# Patient Record
Sex: FEMALE | Race: WHITE | Employment: FULL TIME | URBAN - METROPOLITAN AREA
[De-identification: names, ages, dates, MRNs, and addresses within clinical notes are randomized per-mention and may not be internally consistent; named-entity substitution may affect disease eponyms.]

---

## 2017-07-19 ENCOUNTER — HOSPITAL ENCOUNTER (EMERGENCY)
Facility: HOSPITAL | Age: 42
Discharge: HOME | End: 2017-07-19
Attending: EMERGENCY MEDICINE
Payer: COMMERCIAL

## 2017-07-19 ENCOUNTER — APPOINTMENT (OUTPATIENT)
Dept: MRI IMAGING | Facility: HOSPITAL | Age: 42
End: 2017-07-19
Attending: EMERGENCY MEDICINE
Payer: COMMERCIAL

## 2017-07-19 VITALS
RESPIRATION RATE: 18 BRPM | BODY MASS INDEX: 30.12 KG/M2 | DIASTOLIC BLOOD PRESSURE: 81 MMHG | SYSTOLIC BLOOD PRESSURE: 123 MMHG | OXYGEN SATURATION: 97 % | HEIGHT: 63 IN | HEART RATE: 85 BPM | WEIGHT: 170 LBS | TEMPERATURE: 98 F

## 2017-07-19 DIAGNOSIS — G43.809 OTHER TYPE OF NONINTRACTABLE MIGRAINE: Primary | ICD-10-CM

## 2017-07-19 LAB
ANION GAP SERPL CALC-SCNC: 8 MMOL/L (ref 0–18)
BACTERIA UR QL AUTO: NEGATIVE /HPF
BASOPHILS # BLD: 0.1 K/UL (ref 0–0.2)
BASOPHILS NFR BLD: 1 %
BILIRUB UR QL: NEGATIVE
BUN SERPL-MCNC: 12 MG/DL (ref 8–20)
BUN/CREAT SERPL: 17.6 (ref 10–20)
CALCIUM SERPL-MCNC: 10.1 MG/DL (ref 8.5–10.5)
CHLORIDE SERPL-SCNC: 106 MMOL/L (ref 95–110)
CLARITY UR: CLEAR
CO2 SERPL-SCNC: 22 MMOL/L (ref 22–32)
COLOR UR: YELLOW
CREAT SERPL-MCNC: 0.68 MG/DL (ref 0.5–1.5)
EOSINOPHIL # BLD: 0.2 K/UL (ref 0–0.7)
EOSINOPHIL NFR BLD: 4 %
ERYTHROCYTE [DISTWIDTH] IN BLOOD BY AUTOMATED COUNT: 14.1 % (ref 11–15)
GLUCOSE SERPL-MCNC: 94 MG/DL (ref 70–99)
GLUCOSE UR-MCNC: NEGATIVE MG/DL
HCT VFR BLD AUTO: 39.1 % (ref 35–48)
HGB BLD-MCNC: 13.2 G/DL (ref 12–16)
KETONES UR-MCNC: NEGATIVE MG/DL
LEUKOCYTE ESTERASE UR QL STRIP.AUTO: NEGATIVE
LYMPHOCYTES # BLD: 2.7 K/UL (ref 1–4)
LYMPHOCYTES NFR BLD: 42 %
MCH RBC QN AUTO: 29.5 PG (ref 27–32)
MCHC RBC AUTO-ENTMCNC: 33.8 G/DL (ref 32–37)
MCV RBC AUTO: 87.5 FL (ref 80–100)
MONOCYTES # BLD: 0.5 K/UL (ref 0–1)
MONOCYTES NFR BLD: 8 %
NEUTROPHILS # BLD AUTO: 2.9 K/UL (ref 1.8–7.7)
NEUTROPHILS NFR BLD: 45 %
NITRITE UR QL STRIP.AUTO: NEGATIVE
OSMOLALITY UR CALC.SUM OF ELEC: 282 MOSM/KG (ref 275–295)
PH UR: 5 [PH] (ref 5–8)
PLATELET # BLD AUTO: 278 K/UL (ref 140–400)
PMV BLD AUTO: 8 FL (ref 7.4–10.3)
POTASSIUM SERPL-SCNC: 4.1 MMOL/L (ref 3.3–5.1)
PROT UR-MCNC: NEGATIVE MG/DL
RBC # BLD AUTO: 4.47 M/UL (ref 3.7–5.4)
RBC #/AREA URNS AUTO: 1 /HPF
SODIUM SERPL-SCNC: 136 MMOL/L (ref 136–144)
SP GR UR STRIP: 1.01 (ref 1–1.03)
UROBILINOGEN UR STRIP-ACNC: <2
VIT C UR-MCNC: NEGATIVE MG/DL
WBC # BLD AUTO: 6.4 K/UL (ref 4–11)
WBC #/AREA URNS AUTO: <1 /HPF

## 2017-07-19 PROCEDURE — 96374 THER/PROPH/DIAG INJ IV PUSH: CPT

## 2017-07-19 PROCEDURE — 70551 MRI BRAIN STEM W/O DYE: CPT | Performed by: EMERGENCY MEDICINE

## 2017-07-19 PROCEDURE — 93005 ELECTROCARDIOGRAM TRACING: CPT

## 2017-07-19 PROCEDURE — 96375 TX/PRO/DX INJ NEW DRUG ADDON: CPT

## 2017-07-19 PROCEDURE — 99285 EMERGENCY DEPT VISIT HI MDM: CPT

## 2017-07-19 PROCEDURE — 93010 ELECTROCARDIOGRAM REPORT: CPT | Performed by: EMERGENCY MEDICINE

## 2017-07-19 PROCEDURE — 80048 BASIC METABOLIC PNL TOTAL CA: CPT | Performed by: EMERGENCY MEDICINE

## 2017-07-19 PROCEDURE — 81001 URINALYSIS AUTO W/SCOPE: CPT | Performed by: EMERGENCY MEDICINE

## 2017-07-19 PROCEDURE — 85025 COMPLETE CBC W/AUTO DIFF WBC: CPT | Performed by: EMERGENCY MEDICINE

## 2017-07-19 RX ORDER — METOCLOPRAMIDE HYDROCHLORIDE 5 MG/ML
5 INJECTION INTRAMUSCULAR; INTRAVENOUS ONCE
Status: COMPLETED | OUTPATIENT
Start: 2017-07-19 | End: 2017-07-19

## 2017-07-19 RX ORDER — KETOROLAC TROMETHAMINE 30 MG/ML
30 INJECTION, SOLUTION INTRAMUSCULAR; INTRAVENOUS ONCE
Status: COMPLETED | OUTPATIENT
Start: 2017-07-19 | End: 2017-07-19

## 2017-07-19 NOTE — ED INITIAL ASSESSMENT (HPI)
Pt c/o bilateral eye blurry vision and headaches since Monday. Had similar s/s 2 years ago and was diagnosed with htn. Has been on antihypertensives since then. Pt states had her BP checked pta and was \"158/94\". Pt's /85 in triage.

## 2017-07-19 NOTE — ED PROVIDER NOTES
Patient Seen in: [facility] AND [facility] Emergency Department    History   Patient presents with:  Blurred Vision  Headache    Stated Complaint: Blurred vision/ HTN    HPI    Patient is a 79-year-old female who complains of 2 days of very mild headache and vis well-nourished. HEENT:   Head: Normocephalic and atraumatic.    Right Ear: External ear normal.   Left Ear: External ear normal.   Nose: Nose normal.   Mouth/Throat: Oropharynx is clear and moist.   Eyes: Conjunctivae and EOM are normal. Pupils are equal, improved.  ============================================================  ED Course  ------------------------------------------------------------  MDM             MRI findings were shared with patient.    I Encouraged her to follow-up with neurology as instr

## 2017-07-19 NOTE — ED NOTES
Pt admits to feeling much better at this time, headache is completely gone at this time and there is no tingling to the fingers. Sts her blurred vision is almost gone at this time.

## 2017-07-21 ENCOUNTER — OFFICE VISIT (OUTPATIENT)
Dept: NEUROLOGY | Facility: CLINIC | Age: 42
End: 2017-07-21

## 2017-07-21 VITALS
BODY MASS INDEX: 29.77 KG/M2 | HEART RATE: 72 BPM | HEIGHT: 63 IN | WEIGHT: 168 LBS | SYSTOLIC BLOOD PRESSURE: 138 MMHG | RESPIRATION RATE: 16 BRPM | DIASTOLIC BLOOD PRESSURE: 84 MMHG

## 2017-07-21 DIAGNOSIS — R20.2 NUMBNESS AND TINGLING: ICD-10-CM

## 2017-07-21 DIAGNOSIS — G43.009 MIGRAINE WITHOUT AURA AND WITHOUT STATUS MIGRAINOSUS, NOT INTRACTABLE: Primary | ICD-10-CM

## 2017-07-21 DIAGNOSIS — R20.0 NUMBNESS AND TINGLING: ICD-10-CM

## 2017-07-21 DIAGNOSIS — R90.89 ABNORMAL FINDING ON MRI OF BRAIN: ICD-10-CM

## 2017-07-21 PROBLEM — R51.9 HEADACHE: Status: ACTIVE | Noted: 2017-07-21

## 2017-07-21 PROCEDURE — 99244 OFF/OP CNSLTJ NEW/EST MOD 40: CPT | Performed by: OTHER

## 2017-07-21 RX ORDER — DOXEPIN HYDROCHLORIDE 50 MG/1
1 CAPSULE ORAL DAILY
COMMUNITY

## 2017-07-21 RX ORDER — LOSARTAN POTASSIUM 25 MG/1
25 TABLET ORAL
Refills: 1 | Status: ON HOLD | COMMUNITY
Start: 2017-07-11 | End: 2020-10-08

## 2017-07-21 RX ORDER — METHYLPREDNISOLONE 4 MG/1
TABLET ORAL
Qty: 1 PACKAGE | Refills: 0 | Status: ON HOLD | OUTPATIENT
Start: 2017-07-21 | End: 2020-10-08

## 2017-07-21 NOTE — PATIENT INSTRUCTIONS
Migraine headache  Introduction  Migraines are extremely painful, recurring headaches that are sometimes accompanied by other symptoms, such as visual disturbances, for example, seeing an aura or nausea.  There are 2 types of migraine:  Migraine with aura, vessels narrow or constrict, reducing blood flow and leading to visual disturbances, difficulty speaking, weakness, numbness, or tingling sensation in one area of the body, or other similar symptoms.  Later, the blood vessels dilate or enlarge, leading to i whether you have a migraine or another kind of headache, such as a tension or sinus headache.  Your doctor will ask questions about when your headaches occur, how long they last, how often they come on, the location of the pain, and any symptoms that accomp and time it started. Note what you ate for the preceding 24 hours, how long you slept the night before, what you were doing just before the headache, any unusual stress in your life, how long the headache lasted, and what you did to make it stop.   Other li drugs help prevent migraines, although researchers are not sure why:   Divalproex sodium (Depakote)   Gabapentin (Neurontin)   Topiramate (Topamax)   Botox.  Botox, a medication made from a purified form of botulinum toxin, has been approved to treat migrai Cheese   Monosodium glutamate (MSG), a flavor enhancer found often in food from [employer] containing the amino acid tyramine, found in red wine, aged cheese, smoked fish, chicken livers, figs, and some beans   Nuts   Peanut butter   Heath people with low levels of magnesium. In one study, people who took magnesium reduce the frequency of attacks by 41.6%, compared to 15.8% in those who took placebo.  Some studies also suggest that magnesium may help women whose migraines are triggered by the months. More research is needed to see whether butterbur is effective at preventing migraines. The studies used a standardized extract that lowered the amount of substances in the herb that might potentially harm the liver.  If you want to try butterbur for sinensis). Ask your doctor before taking Diane Elk, as it may interact with some medications or cause problems for people with some cancers. Tania (Zingiber officinale)   Ginkgo biloba   [place] bark(Salix spp.).  People who are sensitive to aspirin shoul are believed to correspond to areas throughout the body. Preliminary studies suggest it may relieve pain and allow people with migraines to take less pain medication. More research is needed.  Practitioners believe reflexology helps you become more aware of the head and may be relieved following urination; this remedy is most appropriate for individuals who feel extremely weak and have difficulty keeping their eyes open. Ignatia.  For pain that may be described as a feeling of something being driven into the pattern (such as every seven days), and are accompanied by nausea and vomiting; pain is aggravated by motion, light or sun exposure, odors, and noise; this remedy is appropriate for children who may have a craving for spicy or acidic foods, despite having over-the-counter or prescription, or any complementary therapy before or during your pregnancy. Some doctors may recommend treating mild-to-moderate attacks during pregnancy with acetaminophen (Tylenol).   Warnings and Precautions  Use medications only as d

## 2017-07-21 NOTE — PROGRESS NOTES
Neurology Outpatient Consult Note    Saurav Glass : 1975   Referring Physician: Dr. Brissa Lee  HPI:     Saurav Glass is a 36year old female who is being seen in neurologic evaluation.     Patient is accompanied by her mother and her si Oral Tab Take 25 mg by mouth once daily. Disp:  Rfl: 1   multivitamin Oral Tab Take 1 tablet by mouth daily.  Disp:  Rfl:    methylPREDNISolone (MEDROL) 4 MG Oral Tablet Therapy Pack Take as directed Disp: 1 Package Rfl: 0      Past Medical History:   Diagn red desaturation, no dysarthria or dysphonia  Motor: 5/5 strength proximally and distally; normal bulk and tone; no drift  Sensory: intact to light touch and pinprick throughout  Reflexes: DTRs 2+ in bilateral biceps, brachioradialis, 3+ in patella, no ank ESR, SSA and SSB, treponemal antibody screen, vitamin D       Follow-up in 8 weeks time    Shavon Mccain MD

## 2017-07-24 ENCOUNTER — TELEPHONE (OUTPATIENT)
Dept: NEUROLOGY | Facility: CLINIC | Age: 42
End: 2017-07-24

## 2017-07-24 NOTE — TELEPHONE ENCOUNTER
[employer] for authorization of approval for MRI brain w.wo. Approval was given with  Authorization # [id number] effective 07/24/17 to 08/22/17. [employer] for authorization of approval for MRI C-spine w/wo.   Approval was given with Authorization # [id number]

## 2017-07-25 ENCOUNTER — HOSPITAL ENCOUNTER (OUTPATIENT)
Dept: MRI IMAGING | Facility: HOSPITAL | Age: 42
Discharge: HOME | End: 2017-07-25
Attending: Other
Payer: COMMERCIAL

## 2017-07-25 ENCOUNTER — TELEPHONE (OUTPATIENT)
Dept: NEUROLOGY | Facility: CLINIC | Age: 42
End: 2017-07-25

## 2017-07-25 DIAGNOSIS — R20.2 NUMBNESS AND TINGLING: ICD-10-CM

## 2017-07-25 DIAGNOSIS — R20.0 NUMBNESS AND TINGLING: ICD-10-CM

## 2017-07-25 DIAGNOSIS — R90.89 ABNORMAL FINDING ON MRI OF BRAIN: ICD-10-CM

## 2017-07-25 DIAGNOSIS — G43.009 MIGRAINE WITHOUT AURA AND WITHOUT STATUS MIGRAINOSUS, NOT INTRACTABLE: ICD-10-CM

## 2017-07-25 PROCEDURE — 70553 MRI BRAIN STEM W/O & W/DYE: CPT | Performed by: OTHER

## 2017-07-25 PROCEDURE — 72156 MRI NECK SPINE W/O & W/DYE: CPT | Performed by: OTHER

## 2017-07-25 PROCEDURE — A9575 INJ GADOTERATE MEGLUMI 0.1ML: HCPCS | Performed by: OTHER

## 2017-07-26 ENCOUNTER — TELEPHONE (OUTPATIENT)
Dept: NEUROLOGY | Facility: CLINIC | Age: 42
End: 2017-07-26

## 2017-07-26 RX ORDER — AMITRIPTYLINE HYDROCHLORIDE 25 MG/1
25 TABLET, FILM COATED ORAL NIGHTLY
Qty: 30 TABLET | Refills: 1 | Status: SHIPPED | OUTPATIENT
Start: 2017-07-26 | End: 2017-09-17

## 2017-07-26 NOTE — TELEPHONE ENCOUNTER
S/w pt, discussed results. Will need repeat MRI brain in 6 mos. Sent Rx for elavil, risks / side effects discussed; pt to call w/ update in 2 wks.

## 2017-09-12 ENCOUNTER — PATIENT MESSAGE (OUTPATIENT)
Dept: NEUROLOGY | Facility: CLINIC | Age: 42
End: 2017-09-12

## 2017-09-12 NOTE — TELEPHONE ENCOUNTER
From: Pradip Washingotn  To: Sridevi Dumas MD  Sent: 9/12/2017 2:04 PM CDT  Subject: Visit Follow-up Question    Good Afternoon Dr. Cassandra Pedraza,  I wanted to give you an update. As you know you prescribed to me Amitriptyline for my migraines.  I have still b

## 2017-09-18 NOTE — TELEPHONE ENCOUNTER
Medication request: Amitriptyline 25 mg take 1 tab nightly, qt:30 1 refill    LOV: 7/21/17  NOV: None  Last refill: 7/26/17 qt:30 1 refill

## 2017-09-19 RX ORDER — AMITRIPTYLINE HYDROCHLORIDE 25 MG/1
25 TABLET, FILM COATED ORAL NIGHTLY
Qty: 30 TABLET | Refills: 1 | Status: SHIPPED | OUTPATIENT
Start: 2017-09-19 | End: 2020-10-13

## 2020-10-06 ENCOUNTER — APPOINTMENT (OUTPATIENT)
Dept: CV DIAGNOSTICS | Facility: HOSPITAL | Age: 45
DRG: 281 | End: 2020-10-06
Attending: INTERNAL MEDICINE
Payer: COMMERCIAL

## 2020-10-06 ENCOUNTER — APPOINTMENT (OUTPATIENT)
Dept: CT IMAGING | Facility: HOSPITAL | Age: 45
DRG: 281 | End: 2020-10-06
Attending: EMERGENCY MEDICINE
Payer: COMMERCIAL

## 2020-10-06 ENCOUNTER — HOSPITAL ENCOUNTER (INPATIENT)
Facility: HOSPITAL | Age: 45
LOS: 2 days | Discharge: HOME | DRG: 281 | End: 2020-10-08
Attending: EMERGENCY MEDICINE | Admitting: HOSPITALIST
Payer: COMMERCIAL

## 2020-10-06 DIAGNOSIS — R51.9 ACUTE NONINTRACTABLE HEADACHE, UNSPECIFIED HEADACHE TYPE: ICD-10-CM

## 2020-10-06 DIAGNOSIS — R42 DIZZINESS: ICD-10-CM

## 2020-10-06 DIAGNOSIS — R77.8 ELEVATED TROPONIN: Primary | ICD-10-CM

## 2020-10-06 DIAGNOSIS — R91.8 PULMONARY NODULES: ICD-10-CM

## 2020-10-06 PROBLEM — R79.89 ELEVATED TROPONIN: Status: ACTIVE | Noted: 2020-10-06

## 2020-10-06 PROCEDURE — 93306 TTE W/DOPPLER COMPLETE: CPT | Performed by: INTERNAL MEDICINE

## 2020-10-06 PROCEDURE — 3008F BODY MASS INDEX DOCD: CPT | Performed by: HOSPITALIST

## 2020-10-06 PROCEDURE — 99222 1ST HOSP IP/OBS MODERATE 55: CPT | Performed by: HOSPITALIST

## 2020-10-06 PROCEDURE — 3079F DIAST BP 80-89 MM HG: CPT | Performed by: HOSPITALIST

## 2020-10-06 PROCEDURE — 71260 CT THORAX DX C+: CPT | Performed by: EMERGENCY MEDICINE

## 2020-10-06 PROCEDURE — 3074F SYST BP LT 130 MM HG: CPT | Performed by: HOSPITALIST

## 2020-10-06 RX ORDER — LOSARTAN POTASSIUM 25 MG/1
25 TABLET ORAL NIGHTLY
Status: DISCONTINUED | OUTPATIENT
Start: 2020-10-06 | End: 2020-10-07

## 2020-10-06 RX ORDER — ZOLPIDEM TARTRATE 5 MG/1
5 TABLET ORAL NIGHTLY PRN
COMMUNITY
End: 2021-04-27

## 2020-10-06 RX ORDER — METOPROLOL TARTRATE 100 MG/1
100 TABLET ORAL ONCE AS NEEDED
Status: ACTIVE | OUTPATIENT
Start: 2020-10-06 | End: 2020-10-06

## 2020-10-06 RX ORDER — ACETAMINOPHEN 325 MG/1
650 TABLET ORAL EVERY 6 HOURS PRN
Status: DISCONTINUED | OUTPATIENT
Start: 2020-10-06 | End: 2020-10-07

## 2020-10-06 RX ORDER — METOCLOPRAMIDE HYDROCHLORIDE 5 MG/ML
5 INJECTION INTRAMUSCULAR; INTRAVENOUS EVERY 8 HOURS PRN
Status: DISCONTINUED | OUTPATIENT
Start: 2020-10-06 | End: 2020-10-08

## 2020-10-06 RX ORDER — ASPIRIN 81 MG/1
324 TABLET, CHEWABLE ORAL ONCE
Status: COMPLETED | OUTPATIENT
Start: 2020-10-06 | End: 2020-10-06

## 2020-10-06 RX ORDER — METOPROLOL TARTRATE 50 MG/1
50 TABLET, FILM COATED ORAL ONCE
Status: COMPLETED | OUTPATIENT
Start: 2020-10-07 | End: 2020-10-07

## 2020-10-06 RX ORDER — DOXEPIN HYDROCHLORIDE 50 MG/1
1 CAPSULE ORAL NIGHTLY
Status: DISCONTINUED | OUTPATIENT
Start: 2020-10-06 | End: 2020-10-08

## 2020-10-06 RX ORDER — METOPROLOL TARTRATE 50 MG/1
50 TABLET, FILM COATED ORAL ONCE AS NEEDED
Status: ACTIVE | OUTPATIENT
Start: 2020-10-06 | End: 2020-10-06

## 2020-10-06 RX ORDER — METOCLOPRAMIDE HYDROCHLORIDE 5 MG/ML
10 INJECTION INTRAMUSCULAR; INTRAVENOUS ONCE
Status: COMPLETED | OUTPATIENT
Start: 2020-10-06 | End: 2020-10-06

## 2020-10-06 RX ORDER — METOPROLOL TARTRATE 50 MG/1
50 TABLET, FILM COATED ORAL ONCE AS NEEDED
Status: COMPLETED | OUTPATIENT
Start: 2020-10-06 | End: 2020-10-07

## 2020-10-06 RX ORDER — METOPROLOL TARTRATE 100 MG/1
100 TABLET ORAL ONCE
Status: COMPLETED | OUTPATIENT
Start: 2020-10-07 | End: 2020-10-07

## 2020-10-06 RX ORDER — TEMAZEPAM 7.5 MG/1
15 CAPSULE ORAL NIGHTLY PRN
Status: DISCONTINUED | OUTPATIENT
Start: 2020-10-06 | End: 2020-10-06

## 2020-10-06 RX ORDER — VENLAFAXINE 25 MG/1
25 TABLET ORAL NIGHTLY
Status: DISCONTINUED | OUTPATIENT
Start: 2020-10-06 | End: 2020-10-08

## 2020-10-06 RX ORDER — NITROGLYCERIN 0.4 MG/1
0.4 TABLET SUBLINGUAL ONCE
Status: COMPLETED | OUTPATIENT
Start: 2020-10-06 | End: 2020-10-07

## 2020-10-06 RX ORDER — NAPROXEN 500 MG/1
500 TABLET ORAL ONCE
Status: COMPLETED | OUTPATIENT
Start: 2020-10-06 | End: 2020-10-06

## 2020-10-06 RX ORDER — METOPROLOL TARTRATE 50 MG/1
50 TABLET, FILM COATED ORAL ONCE
Status: COMPLETED | OUTPATIENT
Start: 2020-10-06 | End: 2020-10-06

## 2020-10-06 RX ORDER — METOPROLOL TARTRATE 100 MG/1
100 TABLET ORAL ONCE
Status: COMPLETED | OUTPATIENT
Start: 2020-10-06 | End: 2020-10-06

## 2020-10-06 RX ORDER — METOPROLOL TARTRATE 100 MG/1
100 TABLET ORAL ONCE AS NEEDED
Status: COMPLETED | OUTPATIENT
Start: 2020-10-06 | End: 2020-10-07

## 2020-10-06 RX ORDER — DIPHENHYDRAMINE HYDROCHLORIDE 50 MG/ML
25 INJECTION INTRAMUSCULAR; INTRAVENOUS ONCE
Status: COMPLETED | OUTPATIENT
Start: 2020-10-06 | End: 2020-10-06

## 2020-10-06 RX ORDER — METOPROLOL TARTRATE 5 MG/5ML
5 INJECTION INTRAVENOUS ONCE
Status: COMPLETED | OUTPATIENT
Start: 2020-10-06 | End: 2020-10-06

## 2020-10-06 RX ORDER — METOPROLOL TARTRATE 100 MG/1
100 TABLET ORAL ONCE AS NEEDED
Status: DISCONTINUED | OUTPATIENT
Start: 2020-10-06 | End: 2020-10-08

## 2020-10-06 RX ORDER — ALPRAZOLAM 0.25 MG/1
0.25 TABLET ORAL
Status: ACTIVE | OUTPATIENT
Start: 2020-10-06 | End: 2020-10-07

## 2020-10-06 RX ORDER — DILTIAZEM HYDROCHLORIDE 5 MG/ML
5 INJECTION INTRAVENOUS SEE ADMIN INSTRUCTIONS
Status: DISCONTINUED | OUTPATIENT
Start: 2020-10-06 | End: 2020-10-07

## 2020-10-06 RX ORDER — VENLAFAXINE 25 MG/1
25 TABLET ORAL NIGHTLY
COMMUNITY
End: 2022-02-07

## 2020-10-06 RX ORDER — KETOROLAC TROMETHAMINE 30 MG/ML
30 INJECTION, SOLUTION INTRAMUSCULAR; INTRAVENOUS ONCE
Status: COMPLETED | OUTPATIENT
Start: 2020-10-06 | End: 2020-10-06

## 2020-10-06 RX ORDER — METOPROLOL TARTRATE 50 MG/1
50 TABLET, FILM COATED ORAL ONCE AS NEEDED
Status: DISCONTINUED | OUTPATIENT
Start: 2020-10-06 | End: 2020-10-08

## 2020-10-06 RX ORDER — ZOLPIDEM TARTRATE 5 MG/1
5 TABLET ORAL NIGHTLY PRN
Status: DISCONTINUED | OUTPATIENT
Start: 2020-10-06 | End: 2020-10-08

## 2020-10-06 RX ORDER — METOPROLOL TARTRATE 5 MG/5ML
5 INJECTION INTRAVENOUS SEE ADMIN INSTRUCTIONS
Status: DISCONTINUED | OUTPATIENT
Start: 2020-10-06 | End: 2020-10-07

## 2020-10-06 RX ORDER — ONDANSETRON 2 MG/ML
4 INJECTION INTRAMUSCULAR; INTRAVENOUS EVERY 6 HOURS PRN
Status: DISCONTINUED | OUTPATIENT
Start: 2020-10-06 | End: 2020-10-08

## 2020-10-06 RX ORDER — SODIUM CHLORIDE 0.9 % (FLUSH) 0.9 %
3 SYRINGE (ML) INJECTION AS NEEDED
Status: DISCONTINUED | OUTPATIENT
Start: 2020-10-06 | End: 2020-10-08

## 2020-10-06 NOTE — ED NOTES
Orders for admission, patient is aware of plan and ready to go upstairs.  Any questions, please call ED KATHARINE miller  at extension [phone]  Type of COVID test sent:rapid  COVID Suspicion level: Low    Titratable drug(s) infusing:none  Rate:    LOC at time of transp

## 2020-10-06 NOTE — ED PROVIDER NOTES
Patient Seen in: [facility] AND [facility] Emergency Department      History   Patient presents with:  Dizziness    Stated Complaint: dizziness    HPI    58-year-old female with history of hypertension, migraines, here with complaints of dizziness for 1 day.   Sh as noted in HPI. Constitutional and vital signs reviewed. All other systems reviewed and negative except as noted above.     Physical Exam     ED Triage Vitals [10/06/20 1120]   BP (!) 158/100   Pulse 84   Resp 18   Temp 98 °F (36.7 °C)   Temp src oxygenation.     PROCEDURES:  none    DIAGNOSTICS:   Labs:  Recent Results (from the past 24 hour(s))   RAINBOW DRAW LAVENDER    Collection Time: 10/06/20 12:04 PM   Result Value Ref Range    Hold Lavender Auto Resulted    RAINBOW DRAW LIGHT GREEN    Collec %    Immature Granulocyte % 0.3 %   LIPID PANEL    Collection Time: 10/06/20 12:04 PM   Result Value Ref Range    Cholesterol, Total 238 (H) <200 mg/dL    HDL Cholesterol 53 40 - 59 mg/dL    Triglycerides 233 (H) 30 - 149 mg/dL    LDL Cholesterol 138 (H) < does not have any pertinent problems on file. to contribute to the complexity of his ED evaluation.         EMERGENCY DEPARTMENT MEDICAL DECISION MAKING:  After obtaining the patient's history, performing the physical exam and reviewing the diagnostics, mul

## 2020-10-06 NOTE — H&P
[facility]    PATIENT'S NAME: Adrianna Florez   ATTENDING PHYSICIAN: Geoffrey Light MD   PATIENT ACCOUNT#:   [id number]    LOCATION:  [place] [address]  MEDICAL RECORD #:   [id number]       YOB: 1975  ADMISSION DATE:       10 PHYSICAL EXAMINATION:    GENERAL:  Alert, oriented to time, place, and person. No acute distress. VITAL SIGNS:  Temperature 98.0, pulse 69, respiratory rate 21, blood pressure 138/95, pulse ox 97% on room air. HEENT:  Atraumatic.   Oropharynx clear

## 2020-10-06 NOTE — CONSULTS
[facility] Consult Note    Everett Casey Patient Status:  Emergency    1975 MRN [id number]   Location [address] Attending Laura Rosales MD   Hosp Day # 0 PCP HA JOSEPH     54year old female, consulted for never smoked. She has never used smokeless tobacco. She reports current alcohol use. She reports that she does not use drugs.     Objective:   Temp: 98 °F (36.7 °C)  Pulse: 64  Resp: 15  BP: 148/99    Intake/Output:   No intake or output data in the 24 hour

## 2020-10-06 NOTE — ED NOTES
Patient states her head pain is mild, feeling better, aware of blood result, denies chest pain/fernando

## 2020-10-06 NOTE — ED PROVIDER NOTES
Patient signed out by previous shift to follow-up second troponin and disposition patient. Patient second troponin is 0.065 which is relatively unchanged from previous.   Discussed with [facility] who recommended patient stay in the hospital.  They woul

## 2020-10-07 ENCOUNTER — APPOINTMENT (OUTPATIENT)
Dept: CT IMAGING | Facility: HOSPITAL | Age: 45
DRG: 281 | End: 2020-10-07
Attending: INTERNAL MEDICINE
Payer: COMMERCIAL

## 2020-10-07 ENCOUNTER — APPOINTMENT (OUTPATIENT)
Dept: ULTRASOUND IMAGING | Facility: HOSPITAL | Age: 45
DRG: 281 | End: 2020-10-07
Attending: INTERNAL MEDICINE
Payer: COMMERCIAL

## 2020-10-07 PROCEDURE — 93975 VASCULAR STUDY: CPT | Performed by: INTERNAL MEDICINE

## 2020-10-07 PROCEDURE — 3079F DIAST BP 80-89 MM HG: CPT | Performed by: INTERNAL MEDICINE

## 2020-10-07 PROCEDURE — 99233 SBSQ HOSP IP/OBS HIGH 50: CPT | Performed by: INTERNAL MEDICINE

## 2020-10-07 PROCEDURE — 76775 US EXAM ABDO BACK WALL LIM: CPT | Performed by: INTERNAL MEDICINE

## 2020-10-07 PROCEDURE — 3074F SYST BP LT 130 MM HG: CPT | Performed by: INTERNAL MEDICINE

## 2020-10-07 PROCEDURE — 75574 CT ANGIO HRT W/3D IMAGE: CPT | Performed by: INTERNAL MEDICINE

## 2020-10-07 PROCEDURE — 3008F BODY MASS INDEX DOCD: CPT | Performed by: INTERNAL MEDICINE

## 2020-10-07 RX ORDER — POTASSIUM CHLORIDE 20 MEQ/1
40 TABLET, EXTENDED RELEASE ORAL ONCE
Status: COMPLETED | OUTPATIENT
Start: 2020-10-07 | End: 2020-10-07

## 2020-10-07 RX ORDER — ASPIRIN 81 MG/1
81 TABLET, CHEWABLE ORAL DAILY
Status: DISCONTINUED | OUTPATIENT
Start: 2020-10-07 | End: 2020-10-08

## 2020-10-07 RX ORDER — ATORVASTATIN CALCIUM 40 MG/1
80 TABLET, FILM COATED ORAL NIGHTLY
Status: DISCONTINUED | OUTPATIENT
Start: 2020-10-07 | End: 2020-10-08

## 2020-10-07 RX ORDER — TRAMADOL HYDROCHLORIDE 50 MG/1
50 TABLET ORAL EVERY 6 HOURS PRN
Status: DISCONTINUED | OUTPATIENT
Start: 2020-10-07 | End: 2020-10-08

## 2020-10-07 RX ORDER — SUMATRIPTAN 50 MG/1
50 TABLET, FILM COATED ORAL EVERY 2 HOUR PRN
Status: DISCONTINUED | OUTPATIENT
Start: 2020-10-07 | End: 2020-10-08

## 2020-10-07 RX ORDER — ENALAPRIL MALEATE 5 MG/1
5 TABLET ORAL 2 TIMES DAILY
Status: DISCONTINUED | OUTPATIENT
Start: 2020-10-07 | End: 2020-10-08

## 2020-10-07 RX ORDER — CARVEDILOL 6.25 MG/1
6.25 TABLET ORAL 2 TIMES DAILY WITH MEALS
Status: DISCONTINUED | OUTPATIENT
Start: 2020-10-07 | End: 2020-10-08

## 2020-10-07 RX ORDER — ACETAMINOPHEN 325 MG/1
650 TABLET ORAL EVERY 6 HOURS PRN
Status: DISCONTINUED | OUTPATIENT
Start: 2020-10-07 | End: 2020-10-08

## 2020-10-07 RX ORDER — METOPROLOL TARTRATE 5 MG/5ML
INJECTION INTRAVENOUS
Status: COMPLETED
Start: 2020-10-07 | End: 2020-10-07

## 2020-10-07 NOTE — PLAN OF CARE
Pain managed with Naproxen, given ambien, admission and med req complete, pt denies any concern, resting comfortably in hospital bed. Pt ambulatory, independent with care. Bed locked in lowest position. Call light on reach, will continue to monitor.       P if interventions unsuccessful or patient reports new pain  - Anticipate increased pain with activity and pre-medicate as appropriate  Outcome: Progressing

## 2020-10-07 NOTE — PLAN OF CARE
Ambulates independently. Replaced k. US kidney, CTA, and 2d echo done today, results in chart. C/o constant headache. Received tylenol and imitrex which did not provide relief. MD paged for alternative pain medication, gave order for PRN tramadol.  Patient bleeding, hypotension and signs of decreased cardiac output  - Evaluate effectiveness of vasoactive medications to optimize hemodynamic stability  - Monitor arterial and/or venous puncture sites for bleeding and/or hematoma  - Assess quality of pulses, ski

## 2020-10-07 NOTE — PROGRESS NOTES
[facility] AND [facility]  [facility] Cardiology Progress Note    Fay Cabrera Patient Status:  Inpatient    1975 MRN [id number]   Location [facility] 3W/SW Attending Delia Zuluaga MD   Hosp Day # 1 PCP HA JOSEPH     54year old female, cons Exam:    General: NAD  HEENT: Normocephalic, anicteric sclera, neck supple. Neck: No JVD, carotids 2+, no bruits. Cardiac: Regular rate and rhythm. S1, S2 normal. No murmur, pericardial rub, S3.  Lungs: Clear without wheezes, rales, rhonchi or dullness.

## 2020-10-07 NOTE — PROGRESS NOTES
[facility]    Progress Note    Ludin Mancia Patient Status:  Inpatient    1975 MRN [id number]   Location [facility] 3W/SW Attending Casey Coleman MD   [facility] Day # 1 PCP HA JOSEPH       Subjective:   No acute events 25 mg Oral Nightly   • multivitamin  1 tablet Oral Nightly   • Venlafaxine HCl  25 mg Oral Nightly       Current PRN Inpatient Meds:      acetaminophen, metoprolol tartrate **OR** metoprolol tartrate, Normal Saline Flush, ondansetron HCl, Metoclopramide H change Electronically signed on 10/06/2020 at 15:15 by Sondra Glass MD    Ekg 12-lead    Result Date: 10/6/2020  ECG Report  Interpretation  -------------------------- Sinus Rhythm WITHIN NORMAL LIMITS When compared with ECG of 07/19/2017 11:25:31 No sign

## 2020-10-07 NOTE — IMAGING NOTE
HX TAKEN :pt is an inpatient, with complaints of dizziness    PT CONSENTED BY FLOOR RN IN CHART    BASELINE VITAL SIGNS   HR 59 /85    CTA ORDERED BY DR MAYORGA WAS PT GIVEN CTA  PREMEDS IF YES SEE MAR, METOPROLOL 100MG PO THIS AM BY FLOOR RN   18

## 2020-10-08 VITALS
TEMPERATURE: 98 F | HEART RATE: 75 BPM | HEIGHT: 63 IN | DIASTOLIC BLOOD PRESSURE: 88 MMHG | RESPIRATION RATE: 16 BRPM | OXYGEN SATURATION: 97 % | WEIGHT: 170.13 LBS | SYSTOLIC BLOOD PRESSURE: 124 MMHG | BODY MASS INDEX: 30.14 KG/M2

## 2020-10-08 LAB
CHOLEST SERPL-MCNC: 234 MG/DL
CHOLEST/HDLC SERPL: 50 {RATIO}
HCT VFR BLD CALC: 41.1 %
HGB BLD-MCNC: 13.6 G/DL
PLATELET # BLD: 301 K/MCL
RBC # BLD: 4.57 10*6/UL
TRIGL SERPL-MCNC: 270 MG/DL
VLDLC SERPL CALC-MCNC: 54 MG/DL
WBC # BLD: 8 K/MCL

## 2020-10-08 PROCEDURE — 3008F BODY MASS INDEX DOCD: CPT | Performed by: INTERNAL MEDICINE

## 2020-10-08 PROCEDURE — 3074F SYST BP LT 130 MM HG: CPT | Performed by: INTERNAL MEDICINE

## 2020-10-08 PROCEDURE — 99239 HOSP IP/OBS DSCHRG MGMT >30: CPT | Performed by: INTERNAL MEDICINE

## 2020-10-08 PROCEDURE — 3079F DIAST BP 80-89 MM HG: CPT | Performed by: INTERNAL MEDICINE

## 2020-10-08 RX ORDER — ATORVASTATIN CALCIUM 80 MG/1
80 TABLET, FILM COATED ORAL NIGHTLY
Qty: 30 TABLET | Refills: 2 | Status: SHIPPED | OUTPATIENT
Start: 2020-10-08 | End: 2021-01-11

## 2020-10-08 RX ORDER — CARVEDILOL 12.5 MG/1
12.5 TABLET ORAL 2 TIMES DAILY WITH MEALS
Qty: 60 TABLET | Refills: 2 | Status: SHIPPED | OUTPATIENT
Start: 2020-10-08 | End: 2020-12-10

## 2020-10-08 RX ORDER — SPIRONOLACTONE 25 MG/1
25 TABLET ORAL DAILY
Qty: 30 TABLET | Refills: 2 | Status: SHIPPED | OUTPATIENT
Start: 2020-10-09 | End: 2020-10-13

## 2020-10-08 RX ORDER — ENALAPRIL MALEATE 5 MG/1
5 TABLET ORAL 2 TIMES DAILY
Qty: 60 TABLET | Refills: 2 | Status: SHIPPED | OUTPATIENT
Start: 2020-10-08 | End: 2020-10-27 | Stop reason: ALTCHOICE

## 2020-10-08 RX ORDER — CARVEDILOL 12.5 MG/1
12.5 TABLET ORAL 2 TIMES DAILY WITH MEALS
Status: DISCONTINUED | OUTPATIENT
Start: 2020-10-08 | End: 2020-10-08

## 2020-10-08 RX ORDER — ENALAPRIL MALEATE 5 MG/1
5 TABLET ORAL 2 TIMES DAILY
Status: DISCONTINUED | OUTPATIENT
Start: 2020-10-08 | End: 2020-10-08

## 2020-10-08 RX ORDER — ASPIRIN 81 MG/1
81 TABLET, CHEWABLE ORAL DAILY
Qty: 30 TABLET | Refills: 2 | Status: SHIPPED | OUTPATIENT
Start: 2020-10-09

## 2020-10-08 RX ORDER — ACETAMINOPHEN 325 MG/1
650 TABLET ORAL EVERY 6 HOURS PRN
Refills: 0 | Status: SHIPPED | COMMUNITY
Start: 2020-10-08 | End: 2021-09-28 | Stop reason: ALTCHOICE

## 2020-10-08 RX ORDER — SPIRONOLACTONE 25 MG/1
25 TABLET ORAL DAILY
Status: DISCONTINUED | OUTPATIENT
Start: 2020-10-08 | End: 2020-10-08

## 2020-10-08 NOTE — DISCHARGE PLANNING
Patient and her daughter Conrado (with patient's permission) were both provided with discharge instructions, education, and follow up information. Printed prescriptions were faxed to patient's pharmacy.  Patient and daughter both verbalize understanding of fo

## 2020-10-08 NOTE — PLAN OF CARE
Alert. Pain managed with Tramadol. One dose give and no further complaints. Blood pressure improved. Possible discharge tomorrow. Bed locked and in lowest position. Call in light in reach. Will continue to monitor.       Problem: Patient [address] new pain  - Anticipate increased pain with activity and pre-medicate as appropriate  Outcome: Progressing     Problem: CARDIOVASCULAR - ADULT  Goal: Maintains optimal cardiac output and hemodynamic stability  Description: INTERVENTIONS:  - Monitor vital si

## 2020-10-08 NOTE — DISCHARGE SUMMARY
[facility] - [facility]    Discharge Summary    Robbie Worthy Patient Status:  Inpatient    1975 MRN [id number]   Location [facility] 3W/SW Attending Claudia Zambrano, Kim[facility] Day # 2 PCP KIT JAKE     Date of Admission: 10/6/20 10/8/2020 0813  Gross per 24 hour   Intake 425 ml   Output —   Net 425 ml         GENERAL:  Awake and alert, in no acute distress. HEENT: Normocephalic. Extraocular muscles were intact. PERRL. NECK:  Supple.  Trach midline  CHEST:  Symmetrical movement TROP 0.065 () 10/06/2020       Recent Labs   Lab 10/06/20  1204 10/08/20  0517   RBC 4.33 4.57   HGB 13.1 13.6   HCT 38.8 41.1   MCV 89.6 89.9   MCH 30.3 29.8   MCHC 33.8 33.1   RDW 12.9 13.0   NEPRELIM 3.37  --    WBC 7.5 8.0   .0 301.0     Re daily. Refills: 0     Venlafaxine HCl 25 MG Tabs  Commonly known as: EFFEXOR      Take 25 mg by mouth nightly. Refills: 0     zolpidem 5 MG Tabs  Commonly known as: AMBIEN      Take 5 mg by mouth nightly as needed for Sleep.    Refills: 0        STOP ta

## 2020-10-08 NOTE — PROGRESS NOTES
[facility] AND [facility]  [facility] Cardiology Progress Note    Gabo Rosario Patient Status:  Inpatient    1975 MRN [id number]   Location [facility] 3W/SW Attending Elias Gupta MD   Hosp Day # 2 PCP HA JOSEPH     54year old female, cons (Last 24 hours) at 10/8/2020 0706  Last data filed at 10/8/2020 0600  Gross per 24 hour   Intake 5 ml   Output —   Net 5 ml       Physical Exam:    General: NAD  HEENT: Normocephalic, anicteric sclera, neck supple. Neck: No JVD, carotids 2+, no bruits.   C

## 2020-10-08 NOTE — PLAN OF CARE
Problem: Patient Centered Care  Goal: Patient preferences are identified and integrated in the patient's plan of care  Description: Interventions:  - What would you like us to know as we care for you?  I live at home with my family  - Provide timely, comp output and hemodynamic stability  Description: INTERVENTIONS:  - Monitor vital signs, rhythm, and trends  - Monitor for bleeding, hypotension and signs of decreased cardiac output  - Evaluate effectiveness of vasoactive medications to optimize hemodynamic

## 2020-10-12 NOTE — PROGRESS NOTES
[address] [facility] Patient Status:  No patient class for patient encounter    1975 MRN [id number]   Location [facility] Sanna Fairbanks is a 54year old female CREATSERUM 1.12 (H) 10/13/2020 09:04 AM    BUN 20 (H) 10/13/2020 09:04 AM     10/13/2020 09:04 AM    K 4.7 10/13/2020 09:04 AM     10/13/2020 09:04 AM    CO2 26.0 10/13/2020 09:04 AM     (H) 10/13/2020 09:04 AM    CA 10.0 10/13/2020 09:0 renal function slightly decreased on ACEi and MRA, bun/cr 20/1.12, K 4.7  -will switch to entresto at next visit if renal function remains stable  - decrease spironolactone to 12.5 mg daily with recent diarrhea, watch closely  - repeat bmp at next visit on Disp: 60 tablet, Rfl: 2  •  acetaminophen 325 MG Oral Tab, Take 2 tablets (650 mg total) by mouth every 6 (six) hours as needed. , Disp:  , Rfl: 0  •  Venlafaxine HCl 25 MG Oral Tab, Take 25 mg by mouth nightly., Disp: , Rfl:   •  zolpidem 5 MG Oral Tab, Ta

## 2020-10-13 ENCOUNTER — OFFICE VISIT (OUTPATIENT)
Dept: CARDIOLOGY CLINIC | Facility: HOSPITAL | Age: 45
End: 2020-10-13
Attending: NURSE PRACTITIONER
Payer: COMMERCIAL

## 2020-10-13 VITALS
OXYGEN SATURATION: 100 % | DIASTOLIC BLOOD PRESSURE: 81 MMHG | WEIGHT: 168 LBS | HEART RATE: 79 BPM | SYSTOLIC BLOOD PRESSURE: 130 MMHG | BODY MASS INDEX: 30 KG/M2

## 2020-10-13 DIAGNOSIS — I50.43 ACUTE ON CHRONIC COMBINED SYSTOLIC AND DIASTOLIC HF (HEART FAILURE), NYHA CLASS 2 (HCC): Primary | Chronic | ICD-10-CM

## 2020-10-13 DIAGNOSIS — I50.9 HEART FAILURE, UNSPECIFIED (HCC): ICD-10-CM

## 2020-10-13 DIAGNOSIS — Z91.89 AT RISK FOR SLEEP APNEA: ICD-10-CM

## 2020-10-13 DIAGNOSIS — G47.9 SLEEP DISTURBANCE: ICD-10-CM

## 2020-10-13 DIAGNOSIS — R06.83 SNORING: ICD-10-CM

## 2020-10-13 PROBLEM — I42.8 NICM (NONISCHEMIC CARDIOMYOPATHY) (HCC): Chronic | Status: ACTIVE | Noted: 2020-10-13

## 2020-10-13 PROBLEM — I10 BENIGN HYPERTENSION: Chronic | Status: ACTIVE | Noted: 2020-10-13

## 2020-10-13 PROCEDURE — 99212 OFFICE O/P EST SF 10 MIN: CPT | Performed by: NURSE PRACTITIONER

## 2020-10-13 PROCEDURE — 80048 BASIC METABOLIC PNL TOTAL CA: CPT | Performed by: NURSE PRACTITIONER

## 2020-10-13 PROCEDURE — 83880 ASSAY OF NATRIURETIC PEPTIDE: CPT | Performed by: NURSE PRACTITIONER

## 2020-10-13 PROCEDURE — 36415 COLL VENOUS BLD VENIPUNCTURE: CPT | Performed by: NURSE PRACTITIONER

## 2020-10-13 PROCEDURE — 99214 OFFICE O/P EST MOD 30 MIN: CPT | Performed by: NURSE PRACTITIONER

## 2020-10-13 RX ORDER — SPIRONOLACTONE 25 MG/1
25 TABLET ORAL DAILY
Qty: 30 TABLET | Refills: 2 | COMMUNITY
Start: 2020-10-13 | End: 2020-12-26

## 2020-10-13 NOTE — PATIENT INSTRUCTIONS
Decrease spironolactone to 12.5 mg daily ( half of 25 mg tab)     Continue all your other same medications    Call if having any dizziness, lightheadedness, heart racing, palpitations, chest pain, shortness of breath, coughing,  wheezing, swelling, weight

## 2020-10-14 NOTE — PAYOR COMM NOTE
--------------  CONTINUED STAY REVIEW    Payor: ROHIT ROLDAN  Subscriber #:  [id number]  Authorization Number: [id number]    Admit date: 10/6/20  Admit time: [address] 10/7/20- REQUESTING 1 ADDITIONAL DAY    10/7/20  Subjective:   No MCHC 33.8   RDW 12.9   NEPRELIM 3.37   WBC 7.5   .0      Lab 10/06/20  1204 10/07/20  0612   GLU 96 94   BUN 14 13   CREATSERUM 0.75 0.73   GFRAA 111 114   GFRNAA 96 99   CA 10.2* 9.1    142   K 3.9 3.8    110   CO2 24.0 24.0      Lab

## 2020-10-27 ENCOUNTER — TELEPHONE (OUTPATIENT)
Dept: CARDIOLOGY | Age: 45
End: 2020-10-27

## 2020-10-27 ENCOUNTER — ANCILLARY PROCEDURE (OUTPATIENT)
Dept: CARDIOLOGY | Age: 45
End: 2020-10-27
Attending: INTERNAL MEDICINE

## 2020-10-27 ENCOUNTER — OFFICE VISIT (OUTPATIENT)
Dept: CARDIOLOGY CLINIC | Facility: HOSPITAL | Age: 45
End: 2020-10-27
Attending: NURSE PRACTITIONER
Payer: COMMERCIAL

## 2020-10-27 VITALS
HEART RATE: 65 BPM | WEIGHT: 169.69 LBS | OXYGEN SATURATION: 100 % | DIASTOLIC BLOOD PRESSURE: 74 MMHG | BODY MASS INDEX: 30 KG/M2 | SYSTOLIC BLOOD PRESSURE: 127 MMHG

## 2020-10-27 DIAGNOSIS — I50.9 HEART FAILURE, UNSPECIFIED (HCC): ICD-10-CM

## 2020-10-27 DIAGNOSIS — R00.0 RACING HEART BEAT: ICD-10-CM

## 2020-10-27 DIAGNOSIS — I50.42 CHRONIC COMBINED SYSTOLIC AND DIASTOLIC CHF, NYHA CLASS 2 (HCC): Primary | ICD-10-CM

## 2020-10-27 DIAGNOSIS — I42.8 NICM (NONISCHEMIC CARDIOMYOPATHY) (HCC): ICD-10-CM

## 2020-10-27 DIAGNOSIS — I10 BENIGN HYPERTENSION: ICD-10-CM

## 2020-10-27 DIAGNOSIS — R00.2 PALPITATIONS: Primary | ICD-10-CM

## 2020-10-27 DIAGNOSIS — R00.2 PALPITATIONS: ICD-10-CM

## 2020-10-27 LAB
ANION GAP SERPL CALC-SCNC: 8 MMOL/L
BUN SERPL-MCNC: 15 MG/DL
BUN/CREAT SERPL: 17
CALCIUM SERPL-MCNC: 9.9 MG/DL
CHLORIDE SERPL-SCNC: 106 MMOL/L
CO2 SERPL-SCNC: 25 MMOL/L
CREAT SERPL-MCNC: 0.88 MG/DL
GLUCOSE SERPL-MCNC: 97 MG/DL
POTASSIUM SERPL-SCNC: 4.7 MMOL/L
SODIUM SERPL-SCNC: 139 MMOL/L

## 2020-10-27 PROCEDURE — 99214 OFFICE O/P EST MOD 30 MIN: CPT | Performed by: NURSE PRACTITIONER

## 2020-10-27 PROCEDURE — 99212 OFFICE O/P EST SF 10 MIN: CPT | Performed by: NURSE PRACTITIONER

## 2020-10-27 PROCEDURE — 80048 BASIC METABOLIC PNL TOTAL CA: CPT | Performed by: NURSE PRACTITIONER

## 2020-10-27 PROCEDURE — 36415 COLL VENOUS BLD VENIPUNCTURE: CPT | Performed by: NURSE PRACTITIONER

## 2020-10-27 PROCEDURE — 83880 ASSAY OF NATRIURETIC PEPTIDE: CPT | Performed by: NURSE PRACTITIONER

## 2020-10-27 RX ORDER — SACUBITRIL AND VALSARTAN 24; 26 MG/1; MG/1
1 TABLET, FILM COATED ORAL 2 TIMES DAILY
Qty: 60 TABLET | Refills: 1 | Status: SHIPPED | OUTPATIENT
Start: 2020-10-27 | End: 2020-11-24 | Stop reason: DRUGHIGH

## 2020-10-27 NOTE — PROGRESS NOTES
[address] 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 , Disp: 60 tablet, Rfl: 2  •  Enalapril Maleate 5 MG Oral Tab, Take 1 ta

## 2020-10-27 NOTE — PATIENT INSTRUCTIONS
Stop enalapril today     Begin entresto 24-26 mg tablets twice daily starting 10/29/20 (Thursday) morning     Continue all your other same medications    Call if having any dizziness, lightheadedness, heart racing, palpitations, chest pain, shortness of br

## 2020-10-28 PROCEDURE — 93268 ECG RECORD/REVIEW: CPT | Performed by: INTERNAL MEDICINE

## 2020-10-29 ENCOUNTER — APPOINTMENT (OUTPATIENT)
Dept: CARDIOLOGY | Age: 45
End: 2020-10-29

## 2020-11-04 ENCOUNTER — OFFICE VISIT (OUTPATIENT)
Dept: CARDIOLOGY | Age: 45
End: 2020-11-04

## 2020-11-04 VITALS
SYSTOLIC BLOOD PRESSURE: 130 MMHG | HEART RATE: 75 BPM | DIASTOLIC BLOOD PRESSURE: 78 MMHG | WEIGHT: 169 LBS | BODY MASS INDEX: 29.95 KG/M2 | OXYGEN SATURATION: 98 % | HEIGHT: 63 IN

## 2020-11-04 DIAGNOSIS — I34.0 NONRHEUMATIC MITRAL VALVE REGURGITATION: ICD-10-CM

## 2020-11-04 DIAGNOSIS — I50.20 HFREF (HEART FAILURE WITH REDUCED EJECTION FRACTION) (CMD): Primary | ICD-10-CM

## 2020-11-04 PROCEDURE — 99244 OFF/OP CNSLTJ NEW/EST MOD 40: CPT | Performed by: INTERNAL MEDICINE

## 2020-11-04 RX ORDER — SPIRONOLACTONE 25 MG/1
12.5 TABLET ORAL
COMMUNITY
Start: 2020-10-13 | End: 2021-05-25 | Stop reason: SDUPTHER

## 2020-11-04 RX ORDER — CARVEDILOL 12.5 MG/1
12.5 TABLET ORAL 2 TIMES DAILY
COMMUNITY
Start: 2020-10-08 | End: 2020-11-04 | Stop reason: SDUPTHER

## 2020-11-04 RX ORDER — ZOLPIDEM TARTRATE 5 MG/1
5 TABLET ORAL PRN
COMMUNITY

## 2020-11-04 RX ORDER — ASPIRIN 81 MG/1
81 TABLET, CHEWABLE ORAL DAILY
COMMUNITY
Start: 2020-10-09 | End: 2021-01-04

## 2020-11-04 RX ORDER — CARVEDILOL 25 MG/1
25 TABLET ORAL 2 TIMES DAILY
Qty: 60 TABLET | Refills: 3 | Status: SHIPPED | OUTPATIENT
Start: 2020-11-04 | End: 2021-02-22

## 2020-11-04 RX ORDER — VENLAFAXINE HYDROCHLORIDE 75 MG/1
25 CAPSULE, EXTENDED RELEASE ORAL NIGHTLY
COMMUNITY
Start: 2020-09-11

## 2020-11-04 RX ORDER — DOXEPIN HYDROCHLORIDE 50 MG/1
1 CAPSULE ORAL DAILY
COMMUNITY

## 2020-11-04 RX ORDER — ATORVASTATIN CALCIUM 80 MG/1
80 TABLET, FILM COATED ORAL NIGHTLY
COMMUNITY
Start: 2020-10-08 | End: 2021-01-04

## 2020-11-04 RX ORDER — ACETAMINOPHEN 325 MG/1
650 TABLET ORAL 2 TIMES DAILY PRN
COMMUNITY
Start: 2020-10-08

## 2020-11-04 ASSESSMENT — PATIENT HEALTH QUESTIONNAIRE - PHQ9
SUM OF ALL RESPONSES TO PHQ9 QUESTIONS 1 AND 2: 0
CLINICAL INTERPRETATION OF PHQ2 SCORE: NO FURTHER SCREENING NEEDED
2. FEELING DOWN, DEPRESSED OR HOPELESS: NOT AT ALL
CLINICAL INTERPRETATION OF PHQ9 SCORE: NO FURTHER SCREENING NEEDED
1. LITTLE INTEREST OR PLEASURE IN DOING THINGS: NOT AT ALL
SUM OF ALL RESPONSES TO PHQ9 QUESTIONS 1 AND 2: 0
2. FEELING DOWN, DEPRESSED OR HOPELESS: NOT AT ALL
CLINICAL INTERPRETATION OF PHQ2 SCORE: NO FURTHER SCREENING NEEDED
1. LITTLE INTEREST OR PLEASURE IN DOING THINGS: NOT AT ALL
SUM OF ALL RESPONSES TO PHQ9 QUESTIONS 1 AND 2: 0

## 2020-11-05 ENCOUNTER — TELEPHONE (OUTPATIENT)
Dept: CARDIOLOGY | Age: 45
End: 2020-11-05

## 2020-11-05 PROBLEM — I50.20 HFREF (HEART FAILURE WITH REDUCED EJECTION FRACTION) (CMD): Status: ACTIVE | Noted: 2020-11-05

## 2020-11-05 PROBLEM — I34.0 NONRHEUMATIC MITRAL VALVE REGURGITATION: Status: ACTIVE | Noted: 2020-11-05

## 2020-11-09 ENCOUNTER — E-ADVICE (OUTPATIENT)
Dept: CARDIOLOGY | Age: 45
End: 2020-11-09

## 2020-11-20 ENCOUNTER — APPOINTMENT (OUTPATIENT)
Dept: LAB | Age: 45
End: 2020-11-20
Attending: NURSE PRACTITIONER
Payer: COMMERCIAL

## 2020-11-20 DIAGNOSIS — G47.33 OBSTRUCTIVE SLEEP APNEA (ADULT) (PEDIATRIC): ICD-10-CM

## 2020-11-23 ENCOUNTER — TELEPHONE (OUTPATIENT)
Dept: CARDIOLOGY CLINIC | Facility: HOSPITAL | Age: 45
End: 2020-11-23

## 2020-11-23 ENCOUNTER — OFFICE VISIT (OUTPATIENT)
Dept: SLEEP CENTER | Age: 45
End: 2020-11-23
Attending: NURSE PRACTITIONER
Payer: COMMERCIAL

## 2020-11-23 DIAGNOSIS — Z76.89 SLEEP CONCERN: Primary | ICD-10-CM

## 2020-11-23 PROCEDURE — 95810 POLYSOM 6/> YRS 4/> PARAM: CPT

## 2020-11-23 NOTE — TELEPHONE ENCOUNTER
Pt called reporting her wt is up 6 lbs on new scale. Her scale broke one week ago, feeling good overall, has hand puffiness, no sob, cp or dizziness. On spironolactone 25 mg full tab daily. Having sleep study tonight.  Continue same meds, will see her isaiah

## 2020-11-24 ENCOUNTER — OFFICE VISIT (OUTPATIENT)
Dept: CARDIOLOGY CLINIC | Facility: HOSPITAL | Age: 45
End: 2020-11-24
Attending: NURSE PRACTITIONER
Payer: COMMERCIAL

## 2020-11-24 VITALS
WEIGHT: 174 LBS | HEART RATE: 61 BPM | OXYGEN SATURATION: 99 % | SYSTOLIC BLOOD PRESSURE: 109 MMHG | DIASTOLIC BLOOD PRESSURE: 73 MMHG | BODY MASS INDEX: 31 KG/M2

## 2020-11-24 DIAGNOSIS — I50.43 ACUTE ON CHRONIC COMBINED SYSTOLIC AND DIASTOLIC HF (HEART FAILURE), NYHA CLASS 2 (HCC): Primary | ICD-10-CM

## 2020-11-24 DIAGNOSIS — R00.0 RACING HEART BEAT: ICD-10-CM

## 2020-11-24 DIAGNOSIS — I10 BENIGN HYPERTENSION: ICD-10-CM

## 2020-11-24 DIAGNOSIS — I50.9 HEART FAILURE, UNSPECIFIED (HCC): ICD-10-CM

## 2020-11-24 PROCEDURE — 99212 OFFICE O/P EST SF 10 MIN: CPT | Performed by: NURSE PRACTITIONER

## 2020-11-24 PROCEDURE — 99214 OFFICE O/P EST MOD 30 MIN: CPT | Performed by: NURSE PRACTITIONER

## 2020-11-24 PROCEDURE — 36415 COLL VENOUS BLD VENIPUNCTURE: CPT | Performed by: NURSE PRACTITIONER

## 2020-11-24 PROCEDURE — 80048 BASIC METABOLIC PNL TOTAL CA: CPT | Performed by: NURSE PRACTITIONER

## 2020-11-24 PROCEDURE — 83880 ASSAY OF NATRIURETIC PEPTIDE: CPT | Performed by: NURSE PRACTITIONER

## 2020-11-24 RX ORDER — SACUBITRIL AND VALSARTAN 49; 51 MG/1; MG/1
1 TABLET, FILM COATED ORAL 2 TIMES DAILY
Qty: 60 TABLET | Refills: 1 | Status: SHIPPED | OUTPATIENT
Start: 2020-11-24 | End: 2020-12-26

## 2020-11-24 RX ORDER — FUROSEMIDE 20 MG/1
20 TABLET ORAL DAILY
Refills: 0 | COMMUNITY
Start: 2020-11-25 | End: 2020-11-26

## 2020-11-24 RX ORDER — FUROSEMIDE 20 MG/1
TABLET ORAL
Status: DISCONTINUED
Start: 2020-11-24 | End: 2020-11-24

## 2020-11-24 NOTE — PATIENT INSTRUCTIONS
Take furosemide 20 mg ( half of 40 mg tab) tomorrow in the morning on an empty stomach and again Thursday morning    Increase entresto to 49-51 mg tablets twice daily when finished with current entresto prescription    Continue all your same medications

## 2020-11-24 NOTE — PROGRESS NOTES
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   - BP normal  - wt up 5 lbs at home in the last week, up 5 lbs in clinic over 1 mon mouth 2 (two) times daily. , Disp: 60 tablet, Rfl: 1  •  spironolactone 25 MG Oral Tab, Take 25 mg by mouth daily. , Disp: 30 tablet, Rfl: 2  •  aspirin 81 MG Oral Chew Tab, Chew 1 tablet (81 mg total) by mouth daily. , Disp: 30 tablet, Rfl: 2  •  atorvasta

## 2020-11-26 NOTE — PROCEDURES
[address] [facility]  Accredited by the [employer] ([employer]ASM)    PATIENT'S NAME: Gerardo Le PHYSICIAN: Mendez Obando MD   REFERRING PHYSICIAN: JULIUS Ceja   PATIENT ACCOUNT #: [de-identified] LOCATION: Sleep were in the left side position and 5 minutes in the right side position.   The respiratory event related arousal index is 19.2 events per hour and the spontaneous arousal index is 40.8 events per hour and the PLM-associated arousal index is 63.4 events per significant bradycardia, asystole, tachycardia, nor atrial fibrillation.   CPAP was initiated at 5 CWP and titrated upward to a final value of 9 CWP during which the apnea plus hypopnea index fell to 1.2 events per hour and the lowest desaturation was to 93

## 2020-12-02 ENCOUNTER — TELEPHONE (OUTPATIENT)
Dept: PULMONOLOGY | Facility: CLINIC | Age: 45
End: 2020-12-02

## 2020-12-02 DIAGNOSIS — G47.33 OSA (OBSTRUCTIVE SLEEP APNEA): Primary | ICD-10-CM

## 2020-12-02 NOTE — TELEPHONE ENCOUNTER
----- Message from Chris Mary MD sent at 11/26/2020  9:48 PM CST -----  RN, please call the patient to explain that she has moderate obstructive sleep apnea. Please facilitate set up of CPAP 6 cm water pressure with appropriate follow-up.

## 2020-12-02 NOTE — TELEPHONE ENCOUNTER
Spoke with Heidy Mclain at the specialty clinic, informed her of Dr. Estefania Akhtar message below. Heidy Mclain states she will let Nichole Mendez NP know and will call back if there are any questions.

## 2020-12-02 NOTE — TELEPHONE ENCOUNTER
RN, this patient was accidentally listed as my patient. Please make sure that Nicki Maya follows up with this patient. We will be happy to see in follow-up if needed.

## 2020-12-08 ENCOUNTER — TELEPHONE (OUTPATIENT)
Dept: CARDIOLOGY CLINIC | Facility: HOSPITAL | Age: 45
End: 2020-12-08

## 2020-12-08 DIAGNOSIS — G47.33 OSA (OBSTRUCTIVE SLEEP APNEA): Primary | ICD-10-CM

## 2020-12-10 ENCOUNTER — OFFICE VISIT (OUTPATIENT)
Dept: CARDIOLOGY CLINIC | Facility: HOSPITAL | Age: 45
End: 2020-12-10
Attending: NURSE PRACTITIONER
Payer: COMMERCIAL

## 2020-12-10 VITALS
HEART RATE: 68 BPM | SYSTOLIC BLOOD PRESSURE: 106 MMHG | DIASTOLIC BLOOD PRESSURE: 71 MMHG | OXYGEN SATURATION: 96 % | BODY MASS INDEX: 31 KG/M2 | WEIGHT: 175 LBS

## 2020-12-10 DIAGNOSIS — G47.33 OSA (OBSTRUCTIVE SLEEP APNEA): Chronic | ICD-10-CM

## 2020-12-10 DIAGNOSIS — I50.9 HEART FAILURE, UNSPECIFIED (HCC): ICD-10-CM

## 2020-12-10 DIAGNOSIS — I50.42 CHRONIC COMBINED SYSTOLIC AND DIASTOLIC CHF, NYHA CLASS 2 (HCC): Primary | Chronic | ICD-10-CM

## 2020-12-10 DIAGNOSIS — R53.82 CHRONIC FATIGUE: Chronic | ICD-10-CM

## 2020-12-10 PROCEDURE — 99212 OFFICE O/P EST SF 10 MIN: CPT | Performed by: NURSE PRACTITIONER

## 2020-12-10 PROCEDURE — 80048 BASIC METABOLIC PNL TOTAL CA: CPT | Performed by: NURSE PRACTITIONER

## 2020-12-10 PROCEDURE — 99214 OFFICE O/P EST MOD 30 MIN: CPT | Performed by: NURSE PRACTITIONER

## 2020-12-10 PROCEDURE — 36415 COLL VENOUS BLD VENIPUNCTURE: CPT | Performed by: NURSE PRACTITIONER

## 2020-12-10 RX ORDER — CARVEDILOL 12.5 MG/1
25 TABLET ORAL 2 TIMES DAILY WITH MEALS
COMMUNITY
Start: 2020-12-10 | End: 2020-12-10

## 2020-12-10 RX ORDER — CARVEDILOL 25 MG/1
12.5 TABLET ORAL 2 TIMES DAILY WITH MEALS
COMMUNITY
Start: 2020-12-10 | End: 2021-02-17

## 2020-12-10 NOTE — PROGRESS NOTES
[facility] [address] Patient Status:  No patient class for patient encounter    1975 MRN [id number]   Location [address] [place] [address] No primary care provider on file.   Dr. 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    -persistent fatigue, rare dizziness, BP 1 Chew 1 tablet (81 mg total) by mouth daily. , Disp: 30 tablet, Rfl: 2  •  atorvastatin 80 MG Oral Tab, Take 1 tablet (80 mg total) by mouth nightly., Disp: 30 tablet, Rfl: 2  •  carvedilol 12.5 MG Oral Tab, Take 1 tablet (12.5 mg total) by mouth 2 (two) gerri

## 2020-12-27 RX ORDER — SACUBITRIL AND VALSARTAN 49; 51 MG/1; MG/1
1 TABLET, FILM COATED ORAL 2 TIMES DAILY
Qty: 60 TABLET | Refills: 1 | Status: SHIPPED | OUTPATIENT
Start: 2020-12-27 | End: 2021-01-20 | Stop reason: DRUGHIGH

## 2020-12-27 RX ORDER — SPIRONOLACTONE 25 MG/1
25 TABLET ORAL DAILY
Qty: 30 TABLET | Refills: 2 | Status: SHIPPED | OUTPATIENT
Start: 2020-12-27 | End: 2021-01-20

## 2020-12-29 RX ORDER — ATORVASTATIN CALCIUM 80 MG/1
80 TABLET, FILM COATED ORAL NIGHTLY
Qty: 30 TABLET | Refills: 2 | OUTPATIENT
Start: 2020-12-29

## 2020-12-29 RX ORDER — ASPIRIN 81 MG/1
81 TABLET, CHEWABLE ORAL DAILY
Qty: 30 TABLET | Refills: 2 | OUTPATIENT
Start: 2020-12-29

## 2021-01-04 RX ORDER — ATORVASTATIN CALCIUM 80 MG/1
80 TABLET, FILM COATED ORAL NIGHTLY
Qty: 30 TABLET | Refills: 2 | OUTPATIENT
Start: 2021-01-04

## 2021-01-04 RX ORDER — LORATADINE 10 MG
TABLET ORAL
Qty: 90 TABLET | Refills: 0 | Status: SHIPPED | OUTPATIENT
Start: 2021-01-04 | End: 2021-04-05

## 2021-01-04 RX ORDER — CARVEDILOL 12.5 MG/1
TABLET ORAL
Qty: 180 TABLET | Refills: 0 | Status: SHIPPED | OUTPATIENT
Start: 2021-01-04 | End: 2021-03-04 | Stop reason: ALTCHOICE

## 2021-01-04 RX ORDER — ATORVASTATIN CALCIUM 80 MG/1
TABLET, FILM COATED ORAL
Qty: 90 TABLET | Refills: 0 | Status: SHIPPED | OUTPATIENT
Start: 2021-01-04 | End: 2021-04-05

## 2021-01-04 NOTE — TELEPHONE ENCOUNTER
Patient not seen at [facility]. Seen at [facility]. Refill denied and message sent to pharmacy.

## 2021-01-11 RX ORDER — ATORVASTATIN CALCIUM 80 MG/1
80 TABLET, FILM COATED ORAL NIGHTLY
Qty: 30 TABLET | Refills: 0 | Status: SHIPPED | OUTPATIENT
Start: 2021-01-11

## 2021-01-20 ENCOUNTER — OFFICE VISIT (OUTPATIENT)
Dept: CARDIOLOGY CLINIC | Facility: HOSPITAL | Age: 46
End: 2021-01-20
Attending: NURSE PRACTITIONER
Payer: COMMERCIAL

## 2021-01-20 VITALS
HEART RATE: 58 BPM | DIASTOLIC BLOOD PRESSURE: 71 MMHG | OXYGEN SATURATION: 97 % | SYSTOLIC BLOOD PRESSURE: 121 MMHG | WEIGHT: 171 LBS | BODY MASS INDEX: 30 KG/M2

## 2021-01-20 DIAGNOSIS — I50.42 CHRONIC COMBINED SYSTOLIC AND DIASTOLIC CHF, NYHA CLASS 2 (HCC): Primary | Chronic | ICD-10-CM

## 2021-01-20 DIAGNOSIS — I50.9 HEART FAILURE, UNSPECIFIED (HCC): ICD-10-CM

## 2021-01-20 DIAGNOSIS — I10 BENIGN HYPERTENSION: Chronic | ICD-10-CM

## 2021-01-20 DIAGNOSIS — I50.22 CHRONIC SYSTOLIC HEART FAILURE (HCC): ICD-10-CM

## 2021-01-20 DIAGNOSIS — G47.33 OSA (OBSTRUCTIVE SLEEP APNEA): Chronic | ICD-10-CM

## 2021-01-20 DIAGNOSIS — I42.8 NICM (NONISCHEMIC CARDIOMYOPATHY) (HCC): Chronic | ICD-10-CM

## 2021-01-20 LAB
ANION GAP SERPL CALC-SCNC: 8 MMOL/L
ANION GAP SERPL CALC-SCNC: 8 MMOL/L (ref 0–18)
BUN BLD-MCNC: 13 MG/DL (ref 7–18)
BUN SERPL-MCNC: 13 MG/DL
BUN/CREAT SERPL: 14
BUN/CREAT SERPL: 14 (ref 10–20)
CALCIUM BLD-MCNC: 9.9 MG/DL (ref 8.5–10.1)
CALCIUM SERPL-MCNC: 9.9 MG/DL
CHLORIDE SERPL-SCNC: 104 MMOL/L
CHLORIDE SERPL-SCNC: 104 MMOL/L (ref 98–112)
CO2 SERPL-SCNC: 27 MMOL/L
CO2 SERPL-SCNC: 27 MMOL/L (ref 21–32)
CREAT BLD-MCNC: 0.93 MG/DL
CREAT SERPL-MCNC: 0.93 MG/DL
GLUCOSE BLD-MCNC: 95 MG/DL (ref 70–99)
GLUCOSE SERPL-MCNC: 95 MG/DL
LENGTH OF FAST TIME PATIENT: NO H
NT-PROBNP SERPL-MCNC: 79 PG/ML (ref ?–125)
OSMOLALITY SERPL CALC.SUM OF ELEC: 288 MOSM/KG (ref 275–295)
PATIENT FASTING Y/N/NP: NO
POTASSIUM SERPL-SCNC: 4.6 MMOL/L
POTASSIUM SERPL-SCNC: 4.6 MMOL/L (ref 3.5–5.1)
SODIUM SERPL-SCNC: 139 MMOL/L
SODIUM SERPL-SCNC: 139 MMOL/L (ref 136–145)

## 2021-01-20 PROCEDURE — 99214 OFFICE O/P EST MOD 30 MIN: CPT | Performed by: NURSE PRACTITIONER

## 2021-01-20 PROCEDURE — 80048 BASIC METABOLIC PNL TOTAL CA: CPT | Performed by: NURSE PRACTITIONER

## 2021-01-20 PROCEDURE — 99212 OFFICE O/P EST SF 10 MIN: CPT | Performed by: NURSE PRACTITIONER

## 2021-01-20 PROCEDURE — 83880 ASSAY OF NATRIURETIC PEPTIDE: CPT | Performed by: NURSE PRACTITIONER

## 2021-01-20 PROCEDURE — 36415 COLL VENOUS BLD VENIPUNCTURE: CPT | Performed by: NURSE PRACTITIONER

## 2021-01-20 RX ORDER — SPIRONOLACTONE 25 MG/1
25 TABLET ORAL DAILY
Qty: 90 TABLET | Refills: 1 | Status: SHIPPED | OUTPATIENT
Start: 2021-01-20 | End: 2021-10-15

## 2021-01-20 RX ORDER — SACUBITRIL AND VALSARTAN 97; 103 MG/1; MG/1
1 TABLET, FILM COATED ORAL 2 TIMES DAILY
Qty: 60 TABLET | Refills: 2 | Status: SHIPPED | OUTPATIENT
Start: 2021-01-20 | End: 2021-01-20

## 2021-01-20 RX ORDER — SACUBITRIL AND VALSARTAN 97; 103 MG/1; MG/1
1 TABLET, FILM COATED ORAL 2 TIMES DAILY
Qty: 180 TABLET | Refills: 1 | Status: SHIPPED | OUTPATIENT
Start: 2021-01-20 | End: 2021-02-17 | Stop reason: DRUGHIGH

## 2021-01-20 RX ORDER — SPIRONOLACTONE 25 MG/1
25 TABLET ORAL DAILY
Qty: 30 TABLET | Refills: 2 | Status: SHIPPED | OUTPATIENT
Start: 2021-01-20 | End: 2021-01-20

## 2021-01-20 NOTE — PATIENT INSTRUCTIONS
Increase entresto to 97/103 mg twice daily     Continue all your same medications    Call if having any dizziness, lightheadedness, heart racing, palpitations, chest pain, shortness of breath, coughing,  wheezing, swelling, weight gain,  or weakness    Milagros Snow

## 2021-01-20 NOTE — PROGRESS NOTES
Radha Otero 89 Patient Status:  No patient class for patient encounter    1975 MRN [id number]   Location [address] [place] [address] No primary care provider on file.   Dr. Haley Escalona is Value Date/Time    WBC 8.0 10/08/2020 05:17 AM    HGB 13.6 10/08/2020 05:17 AM    HCT 41.1 10/08/2020 05:17 AM    .0 10/08/2020 05:17 AM    CREATSERUM 1.00 12/10/2020 08:11 AM    BUN 16 12/10/2020 08:11 AM     12/10/2020 08:11 AM    K 4.6 12/1 III, stage C, EF 30-35%, Grade 3 DD.  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 , Disp: 30 tablet, Rfl: 2  •  Sacubitril-Valsarta

## 2021-01-29 RX ORDER — SACUBITRIL AND VALSARTAN 97; 103 MG/1; MG/1
1 TABLET, FILM COATED ORAL 2 TIMES DAILY
COMMUNITY
Start: 2021-01-20 | End: 2021-03-04 | Stop reason: ALTCHOICE

## 2021-01-29 RX ORDER — LOSARTAN POTASSIUM 25 MG/1
1 TABLET ORAL DAILY
COMMUNITY
Start: 2021-01-04 | End: 2021-03-04 | Stop reason: DRUGHIGH

## 2021-02-04 ENCOUNTER — APPOINTMENT (OUTPATIENT)
Dept: CARDIOLOGY | Age: 46
End: 2021-02-04

## 2021-02-17 ENCOUNTER — OFFICE VISIT (OUTPATIENT)
Dept: CARDIOLOGY CLINIC | Facility: HOSPITAL | Age: 46
End: 2021-02-17
Attending: NURSE PRACTITIONER
Payer: COMMERCIAL

## 2021-02-17 VITALS
HEART RATE: 58 BPM | WEIGHT: 175 LBS | BODY MASS INDEX: 31 KG/M2 | OXYGEN SATURATION: 98 % | SYSTOLIC BLOOD PRESSURE: 97 MMHG | DIASTOLIC BLOOD PRESSURE: 63 MMHG

## 2021-02-17 DIAGNOSIS — I50.42 CHRONIC COMBINED SYSTOLIC AND DIASTOLIC CHF, NYHA CLASS 2 (HCC): Primary | ICD-10-CM

## 2021-02-17 DIAGNOSIS — R55 SYNCOPE AND COLLAPSE: ICD-10-CM

## 2021-02-17 DIAGNOSIS — I50.9 HEART FAILURE, UNSPECIFIED (HCC): ICD-10-CM

## 2021-02-17 DIAGNOSIS — I42.8 NICM (NONISCHEMIC CARDIOMYOPATHY) (HCC): ICD-10-CM

## 2021-02-17 DIAGNOSIS — G47.33 OSA (OBSTRUCTIVE SLEEP APNEA): ICD-10-CM

## 2021-02-17 DIAGNOSIS — I10 BENIGN HYPERTENSION: ICD-10-CM

## 2021-02-17 LAB
ANION GAP SERPL CALC-SCNC: 5 MMOL/L (ref 0–18)
BUN BLD-MCNC: 17 MG/DL (ref 7–18)
BUN SERPL-MCNC: 17 MG/DL
BUN/CREAT SERPL: 18.5
BUN/CREAT SERPL: 18.5 (ref 10–20)
CALCIUM BLD-MCNC: 9.3 MG/DL (ref 8.5–10.1)
CALCIUM SERPL-MCNC: 9.3 MG/DL
CHLORIDE SERPL-SCNC: 107 MMOL/L
CHLORIDE SERPL-SCNC: 107 MMOL/L (ref 98–112)
CO2 SERPL-SCNC: 28 MMOL/L
CO2 SERPL-SCNC: 28 MMOL/L (ref 21–32)
CREAT BLD-MCNC: 0.92 MG/DL
CREAT SERPL-MCNC: 0.92 MG/DL
GLUCOSE BLD-MCNC: 108 MG/DL (ref 70–99)
GLUCOSE SERPL-MCNC: 108 MG/DL
NT-PROBNP SERPL-MCNC: 85 PG/ML (ref ?–125)
OSMOLALITY SERPL CALC.SUM OF ELEC: 292 MOSM/KG (ref 275–295)
PATIENT FASTING Y/N/NP: NO
POTASSIUM SERPL-SCNC: 4.4 MMOL/L
POTASSIUM SERPL-SCNC: 4.4 MMOL/L (ref 3.5–5.1)
SODIUM SERPL-SCNC: 140 MMOL/L
SODIUM SERPL-SCNC: 140 MMOL/L (ref 136–145)

## 2021-02-17 PROCEDURE — 99214 OFFICE O/P EST MOD 30 MIN: CPT | Performed by: NURSE PRACTITIONER

## 2021-02-17 PROCEDURE — 80048 BASIC METABOLIC PNL TOTAL CA: CPT | Performed by: NURSE PRACTITIONER

## 2021-02-17 PROCEDURE — 36415 COLL VENOUS BLD VENIPUNCTURE: CPT | Performed by: NURSE PRACTITIONER

## 2021-02-17 PROCEDURE — 83880 ASSAY OF NATRIURETIC PEPTIDE: CPT | Performed by: NURSE PRACTITIONER

## 2021-02-17 PROCEDURE — 99212 OFFICE O/P EST SF 10 MIN: CPT | Performed by: NURSE PRACTITIONER

## 2021-02-17 RX ORDER — CARVEDILOL 12.5 MG/1
6.25 TABLET ORAL 2 TIMES DAILY WITH MEALS
COMMUNITY
Start: 2021-02-17 | End: 2021-08-16 | Stop reason: ALTCHOICE

## 2021-02-17 RX ORDER — ALBUTEROL SULFATE 90 UG/1
AEROSOL, METERED RESPIRATORY (INHALATION) EVERY 6 HOURS PRN
COMMUNITY

## 2021-02-17 NOTE — PROGRESS NOTES
Radha Otero 89 Patient Status:  No patient class for patient encounter    1975 MRN [id number]   Location [address] [place] [address] No primary care provider on file.   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 Seeing Dr. Bee Public on 3/16 to get  fitted for cpap in March. Has cardiac mri set up for 2/25.       Review of Systems:  Constitutional: negative for chills or fever, + fatigue  Respiratory:  negative for cough, hemoptysis and wheezi triggered events , all with SR.    ECG: 10/6/2020 SR    Echo: 2/11/2021 see care everywhere  INTERPRETATION:  1. Normal left ventricular ejection fraction. 2.  Normal diastolic filling parameters. 3. Normal right ventricular size and function.   4.  No co with the specialty care clinic on 3/30/2021  -reinforced to call with recurrent dizziness, lightheadedness, increased fatigue, wt gain, bloating, swelling, cp , padilla or palpitations.      History of heart racing 2 months ago  - in sinus rhythm/SB 59 bpm toda •  zolpidem 5 MG Oral Tab, Take 5 mg by mouth nightly as needed for Sleep., Disp: , Rfl:   •  carvedilol 25 MG Oral Tab, Take 12.5 mg by mouth 2 (two) times daily with meals. , Disp: , Rfl:   •  multivitamin Oral Tab, Take 1 tablet by mouth daily. , Disp

## 2021-02-22 RX ORDER — CARVEDILOL 25 MG/1
TABLET ORAL
Qty: 180 TABLET | Refills: 0 | Status: SHIPPED | OUTPATIENT
Start: 2021-02-22 | End: 2021-03-04 | Stop reason: DRUGHIGH

## 2021-02-25 ENCOUNTER — HOSPITAL ENCOUNTER (OUTPATIENT)
Dept: MRI IMAGING | Age: 46
Discharge: HOME | End: 2021-02-25
Attending: INTERNAL MEDICINE

## 2021-02-25 DIAGNOSIS — I50.20 HFREF (HEART FAILURE WITH REDUCED EJECTION FRACTION) (CMD): ICD-10-CM

## 2021-02-25 PROCEDURE — 75561 CARDIAC MRI FOR MORPH W/DYE: CPT | Performed by: INTERNAL MEDICINE

## 2021-02-25 PROCEDURE — 75561 CARDIAC MRI FOR MORPH W/DYE: CPT

## 2021-02-25 PROCEDURE — A9585 GADOBUTROL INJECTION: HCPCS | Performed by: INTERNAL MEDICINE

## 2021-02-25 PROCEDURE — 10002805 HB CONTRAST AGENT: Performed by: INTERNAL MEDICINE

## 2021-02-25 RX ORDER — GADOBUTROL 604.72 MG/ML
15 INJECTION INTRAVENOUS ONCE
Status: DISCONTINUED | OUTPATIENT
Start: 2021-02-25 | End: 2021-02-27 | Stop reason: HOSPADM

## 2021-02-25 RX ORDER — GADOBUTROL 604.72 MG/ML
15 INJECTION INTRAVENOUS ONCE
Status: COMPLETED | OUTPATIENT
Start: 2021-02-25 | End: 2021-02-25

## 2021-02-25 RX ADMIN — GADOBUTROL 15 ML: 604.72 INJECTION INTRAVENOUS at 12:30

## 2021-03-04 ENCOUNTER — OFFICE VISIT (OUTPATIENT)
Dept: CARDIOLOGY | Age: 46
End: 2021-03-04

## 2021-03-04 VITALS
DIASTOLIC BLOOD PRESSURE: 80 MMHG | HEART RATE: 63 BPM | OXYGEN SATURATION: 98 % | BODY MASS INDEX: 30.12 KG/M2 | HEIGHT: 63 IN | WEIGHT: 170 LBS | SYSTOLIC BLOOD PRESSURE: 128 MMHG

## 2021-03-04 DIAGNOSIS — I34.0 NONRHEUMATIC MITRAL VALVE REGURGITATION: ICD-10-CM

## 2021-03-04 DIAGNOSIS — I50.20 HFREF (HEART FAILURE WITH REDUCED EJECTION FRACTION) (CMD): Primary | ICD-10-CM

## 2021-03-04 PROCEDURE — 99214 OFFICE O/P EST MOD 30 MIN: CPT | Performed by: INTERNAL MEDICINE

## 2021-03-04 RX ORDER — CARVEDILOL 6.25 MG/1
6.25 TABLET ORAL 2 TIMES DAILY WITH MEALS
Qty: 60 TABLET | Refills: 11 | Status: SHIPPED | OUTPATIENT
Start: 2021-03-04 | End: 2021-05-25 | Stop reason: SDUPTHER

## 2021-03-04 RX ORDER — SACUBITRIL AND VALSARTAN 49; 51 MG/1; MG/1
1 TABLET, FILM COATED ORAL 2 TIMES DAILY
Qty: 60 TABLET | Refills: 3 | Status: SHIPPED | OUTPATIENT
Start: 2021-03-04

## 2021-03-04 ASSESSMENT — PATIENT HEALTH QUESTIONNAIRE - PHQ9
CLINICAL INTERPRETATION OF PHQ2 SCORE: NO FURTHER SCREENING NEEDED
SUM OF ALL RESPONSES TO PHQ9 QUESTIONS 1 AND 2: 0
CLINICAL INTERPRETATION OF PHQ9 SCORE: NO FURTHER SCREENING NEEDED
2. FEELING DOWN, DEPRESSED OR HOPELESS: NOT AT ALL
SUM OF ALL RESPONSES TO PHQ9 QUESTIONS 1 AND 2: 0
1. LITTLE INTEREST OR PLEASURE IN DOING THINGS: NOT AT ALL

## 2021-03-16 ENCOUNTER — OFFICE VISIT (OUTPATIENT)
Dept: PULMONOLOGY | Facility: CLINIC | Age: 46
End: 2021-03-16
Payer: COMMERCIAL

## 2021-03-16 VITALS
HEART RATE: 63 BPM | RESPIRATION RATE: 18 BRPM | HEIGHT: 63 IN | TEMPERATURE: 98 F | WEIGHT: 172.63 LBS | OXYGEN SATURATION: 97 % | SYSTOLIC BLOOD PRESSURE: 110 MMHG | DIASTOLIC BLOOD PRESSURE: 74 MMHG | BODY MASS INDEX: 30.59 KG/M2

## 2021-03-16 DIAGNOSIS — G25.81 RLS (RESTLESS LEGS SYNDROME): ICD-10-CM

## 2021-03-16 DIAGNOSIS — G47.33 OSA (OBSTRUCTIVE SLEEP APNEA): Primary | Chronic | ICD-10-CM

## 2021-03-16 PROCEDURE — 3008F BODY MASS INDEX DOCD: CPT | Performed by: INTERNAL MEDICINE

## 2021-03-16 PROCEDURE — 3074F SYST BP LT 130 MM HG: CPT | Performed by: INTERNAL MEDICINE

## 2021-03-16 PROCEDURE — 99244 OFF/OP CNSLTJ NEW/EST MOD 40: CPT | Performed by: INTERNAL MEDICINE

## 2021-03-16 PROCEDURE — 3078F DIAST BP <80 MM HG: CPT | Performed by: INTERNAL MEDICINE

## 2021-03-16 RX ORDER — ZOLPIDEM TARTRATE 5 MG/1
5 TABLET ORAL NIGHTLY PRN
Qty: 30 TABLET | Refills: 5 | Status: SHIPPED | OUTPATIENT
Start: 2021-03-16 | End: 2021-09-28

## 2021-03-16 RX ORDER — ROPINIROLE 0.5 MG/1
0.5 TABLET, FILM COATED ORAL NIGHTLY
Qty: 30 TABLET | Refills: 6 | Status: SHIPPED | OUTPATIENT
Start: 2021-03-16 | End: 2021-04-15

## 2021-03-16 NOTE — PROGRESS NOTES
Dear  Osito Hall  :           As you know, Darryle Falter is a 42-year-old female who I am now evaluating for sleep disturbance.        HISTORY OF PRESENT ILLNESS: The patient has a history of cardiomyopathy with recent diagnosis of obstructive sleep apnea having underg father alive and well    ALLERGIES TO MEDICATIONS: Morphine    MEDICATIONS: Venlafaxine zolpidem aspirin atorvastatin spironolactone Entresto albuterol carvedilol    REVIEW OF SYSTEMS: Review of Systems:  Vision normal. Ear nose and throat normal. Bowel no TSH, folate, B12, ferritin  4. Ambien 5 mg p.o. nightly as needed  5. Weight loss  6. Avoid alcohol  7. Never drive if at all sleepy  8. Sleep apnea, sleep hygiene and insomnia literature provided  9.   Call me in 1 to 2 weeks see me and update on how

## 2021-03-19 ENCOUNTER — TELEPHONE (OUTPATIENT)
Dept: PULMONOLOGY | Facility: CLINIC | Age: 46
End: 2021-03-19

## 2021-03-30 ENCOUNTER — OFFICE VISIT (OUTPATIENT)
Dept: CARDIOLOGY CLINIC | Facility: HOSPITAL | Age: 46
End: 2021-03-30
Attending: NURSE PRACTITIONER
Payer: COMMERCIAL

## 2021-03-30 VITALS
DIASTOLIC BLOOD PRESSURE: 74 MMHG | SYSTOLIC BLOOD PRESSURE: 112 MMHG | WEIGHT: 170 LBS | OXYGEN SATURATION: 97 % | BODY MASS INDEX: 30 KG/M2 | HEART RATE: 63 BPM

## 2021-03-30 DIAGNOSIS — G47.33 OSA (OBSTRUCTIVE SLEEP APNEA): Chronic | ICD-10-CM

## 2021-03-30 DIAGNOSIS — G25.81 RLS (RESTLESS LEGS SYNDROME): ICD-10-CM

## 2021-03-30 DIAGNOSIS — I50.42 CHRONIC COMBINED SYSTOLIC AND DIASTOLIC CHF, NYHA CLASS 2 (HCC): Primary | Chronic | ICD-10-CM

## 2021-03-30 DIAGNOSIS — I10 BENIGN HYPERTENSION: Chronic | ICD-10-CM

## 2021-03-30 DIAGNOSIS — I42.8 NICM (NONISCHEMIC CARDIOMYOPATHY) (HCC): Chronic | ICD-10-CM

## 2021-03-30 DIAGNOSIS — I50.9 HEART FAILURE, UNSPECIFIED (HCC): ICD-10-CM

## 2021-03-30 DIAGNOSIS — R53.82 CHRONIC FATIGUE: Chronic | ICD-10-CM

## 2021-03-30 LAB
ANION GAP SERPL CALC-SCNC: 3 MMOL/L (ref 0–18)
BUN BLD-MCNC: 18 MG/DL (ref 7–18)
BUN/CREAT SERPL: 19.4 (ref 10–20)
CALCIUM BLD-MCNC: 9.6 MG/DL (ref 8.5–10.1)
CHLORIDE SERPL-SCNC: 109 MMOL/L (ref 98–112)
CO2 SERPL-SCNC: 28 MMOL/L (ref 21–32)
CREAT BLD-MCNC: 0.93 MG/DL
DEPRECATED HBV CORE AB SER IA-ACNC: 159.8 NG/ML
FOLATE SERPL-MCNC: >20 NG/ML (ref 8.7–?)
GLUCOSE BLD-MCNC: 119 MG/DL (ref 70–99)
IRON SATURATION: 16 %
IRON SERPL-MCNC: 62 UG/DL
OSMOLALITY SERPL CALC.SUM OF ELEC: 293 MOSM/KG (ref 275–295)
PATIENT FASTING Y/N/NP: NO
POTASSIUM SERPL-SCNC: 4.3 MMOL/L (ref 3.5–5.1)
SODIUM SERPL-SCNC: 140 MMOL/L (ref 136–145)
TOTAL IRON BINDING CAPACITY: 393 UG/DL (ref 240–450)
TRANSFERRIN SERPL-MCNC: 264 MG/DL (ref 200–360)
TSI SER-ACNC: 1.13 MIU/ML (ref 0.36–3.74)
VIT B12 SERPL-MCNC: 446 PG/ML (ref 193–986)

## 2021-03-30 PROCEDURE — 84466 ASSAY OF TRANSFERRIN: CPT | Performed by: NURSE PRACTITIONER

## 2021-03-30 PROCEDURE — 84443 ASSAY THYROID STIM HORMONE: CPT | Performed by: NURSE PRACTITIONER

## 2021-03-30 PROCEDURE — 99212 OFFICE O/P EST SF 10 MIN: CPT | Performed by: NURSE PRACTITIONER

## 2021-03-30 PROCEDURE — 82607 VITAMIN B-12: CPT | Performed by: NURSE PRACTITIONER

## 2021-03-30 PROCEDURE — 99214 OFFICE O/P EST MOD 30 MIN: CPT | Performed by: NURSE PRACTITIONER

## 2021-03-30 PROCEDURE — 82746 ASSAY OF FOLIC ACID SERUM: CPT | Performed by: NURSE PRACTITIONER

## 2021-03-30 PROCEDURE — 82728 ASSAY OF FERRITIN: CPT | Performed by: NURSE PRACTITIONER

## 2021-03-30 PROCEDURE — 36415 COLL VENOUS BLD VENIPUNCTURE: CPT | Performed by: NURSE PRACTITIONER

## 2021-03-30 PROCEDURE — 83540 ASSAY OF IRON: CPT | Performed by: NURSE PRACTITIONER

## 2021-03-30 PROCEDURE — 80048 BASIC METABOLIC PNL TOTAL CA: CPT | Performed by: NURSE PRACTITIONER

## 2021-03-30 NOTE — PROGRESS NOTES
Radha Otero 89 Patient Status:  No patient class for patient encounter    1975 MRN [id number]   Location [facility] No primary care provider on file.   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   Echocardiogram: 10/6/20, EF 30-35%, mild LVH, no wma, grade 3 diastolic dysfunction,  Coronary CT: 10/7/20 negative for coronary disease       Education:  Patient instructed at length regarding clinic procedures, hours, purpose of clinic visits, s cpap    Chronic fatigue  -possibly related to severe RUBÉN  - anemia work up and thyroid function testing ordered, results pending today  -TSH normal       Plan:     Continue all your same medications    Call if having any dizziness, lightheadedness, heart r 30 tablet, Rfl: 2  •  acetaminophen 325 MG Oral Tab, Take 2 tablets (650 mg total) by mouth every 6 (six) hours as needed. , Disp:  , Rfl: 0  •  Venlafaxine HCl 25 MG Oral Tab, Take 25 mg by mouth nightly., Disp: , Rfl:   •  zolpidem 5 MG Oral Tab, Take 5 m

## 2021-04-05 RX ORDER — LORATADINE 10 MG
TABLET ORAL
Qty: 90 TABLET | Refills: 0 | Status: SHIPPED | OUTPATIENT
Start: 2021-04-05

## 2021-04-05 RX ORDER — ATORVASTATIN CALCIUM 80 MG/1
TABLET, FILM COATED ORAL
Qty: 90 TABLET | Refills: 0 | Status: SHIPPED | OUTPATIENT
Start: 2021-04-05 | End: 2021-05-24 | Stop reason: SDUPTHER

## 2021-04-27 ENCOUNTER — OFFICE VISIT (OUTPATIENT)
Dept: CARDIOLOGY CLINIC | Facility: HOSPITAL | Age: 46
End: 2021-04-27
Attending: NURSE PRACTITIONER
Payer: COMMERCIAL

## 2021-04-27 VITALS — BODY MASS INDEX: 32 KG/M2 | WEIGHT: 179 LBS | OXYGEN SATURATION: 96 %

## 2021-04-27 DIAGNOSIS — I10 BENIGN HYPERTENSION: Chronic | ICD-10-CM

## 2021-04-27 DIAGNOSIS — I50.9 HEART FAILURE, UNSPECIFIED (HCC): ICD-10-CM

## 2021-04-27 DIAGNOSIS — R53.82 CHRONIC FATIGUE: Chronic | ICD-10-CM

## 2021-04-27 DIAGNOSIS — G47.33 OSA (OBSTRUCTIVE SLEEP APNEA): Chronic | ICD-10-CM

## 2021-04-27 DIAGNOSIS — R55 SYNCOPE AND COLLAPSE: Chronic | ICD-10-CM

## 2021-04-27 DIAGNOSIS — I50.43 ACUTE ON CHRONIC COMBINED SYSTOLIC AND DIASTOLIC HF (HEART FAILURE), NYHA CLASS 2 (HCC): Primary | Chronic | ICD-10-CM

## 2021-04-27 PROCEDURE — 80048 BASIC METABOLIC PNL TOTAL CA: CPT | Performed by: NURSE PRACTITIONER

## 2021-04-27 PROCEDURE — 99213 OFFICE O/P EST LOW 20 MIN: CPT | Performed by: NURSE PRACTITIONER

## 2021-04-27 PROCEDURE — 99214 OFFICE O/P EST MOD 30 MIN: CPT | Performed by: NURSE PRACTITIONER

## 2021-04-27 PROCEDURE — 36415 COLL VENOUS BLD VENIPUNCTURE: CPT | Performed by: NURSE PRACTITIONER

## 2021-04-27 RX ORDER — ROPINIROLE 0.5 MG/1
0.5 TABLET, FILM COATED ORAL NIGHTLY
COMMUNITY
End: 2021-08-16 | Stop reason: DRUGHIGH

## 2021-04-27 RX ORDER — FUROSEMIDE 20 MG/1
20 TABLET ORAL AS DIRECTED
Qty: 30 TABLET | Refills: 0 | Status: SHIPPED | OUTPATIENT
Start: 2021-04-27 | End: 2021-05-06

## 2021-04-27 NOTE — PATIENT INSTRUCTIONS
Take furosemide (Lasix) 20 mg today, then on Thursday and Saturday, and Tuesday of the following week in the mornings.     Continue the rest of your medications      Call if having any dizziness, lightheadedness, heart racing, palpitations, chest pain, sh

## 2021-04-27 NOTE — PROGRESS NOTES
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 Normal left ventricular ejection fraction. 2.  Normal diastolic filling parameters.   3. Normal right enedina follow up with Dr. Leisa Florian on 9/9 as scheduled; possible repeat ECHO at that time  - begin cardiac rehab after COVID-19 vaccination, referal given    -reinforced to call with recurrent dizziness, lightheadedness, increased fatigue, wt gain, bloating, swelling and Tuesday of the following week in the mornings. , Disp: 30 tablet, Rfl: 0  •  zolpidem 5 MG Oral Tab, Take 1 tablet (5 mg total) by mouth nightly as needed for Sleep., Disp: 30 tablet, Rfl: 5  •  Sacubitril-Valsartan 49-51 MG Oral Tab, Take 1 tablet by m

## 2021-05-06 ENCOUNTER — OFFICE VISIT (OUTPATIENT)
Dept: CARDIOLOGY CLINIC | Facility: HOSPITAL | Age: 46
End: 2021-05-06
Attending: NURSE PRACTITIONER
Payer: COMMERCIAL

## 2021-05-06 VITALS
WEIGHT: 173 LBS | HEART RATE: 59 BPM | DIASTOLIC BLOOD PRESSURE: 58 MMHG | OXYGEN SATURATION: 97 % | BODY MASS INDEX: 31 KG/M2 | SYSTOLIC BLOOD PRESSURE: 93 MMHG

## 2021-05-06 DIAGNOSIS — G47.33 OSA (OBSTRUCTIVE SLEEP APNEA): ICD-10-CM

## 2021-05-06 DIAGNOSIS — I50.9 HEART FAILURE, UNSPECIFIED (HCC): ICD-10-CM

## 2021-05-06 DIAGNOSIS — R53.82 CHRONIC FATIGUE: ICD-10-CM

## 2021-05-06 DIAGNOSIS — I50.43 ACUTE ON CHRONIC COMBINED SYSTOLIC AND DIASTOLIC HF (HEART FAILURE), NYHA CLASS 2 (HCC): Primary | ICD-10-CM

## 2021-05-06 DIAGNOSIS — I10 BENIGN HYPERTENSION: ICD-10-CM

## 2021-05-06 DIAGNOSIS — I42.8 NICM (NONISCHEMIC CARDIOMYOPATHY) (HCC): ICD-10-CM

## 2021-05-06 PROCEDURE — 80048 BASIC METABOLIC PNL TOTAL CA: CPT | Performed by: NURSE PRACTITIONER

## 2021-05-06 PROCEDURE — 36415 COLL VENOUS BLD VENIPUNCTURE: CPT | Performed by: NURSE PRACTITIONER

## 2021-05-06 PROCEDURE — 99214 OFFICE O/P EST MOD 30 MIN: CPT | Performed by: NURSE PRACTITIONER

## 2021-05-06 PROCEDURE — 99213 OFFICE O/P EST LOW 20 MIN: CPT | Performed by: NURSE PRACTITIONER

## 2021-05-06 RX ORDER — FUROSEMIDE 20 MG/1
20 TABLET ORAL AS DIRECTED
Qty: 30 TABLET | Refills: 0 | COMMUNITY
Start: 2021-05-06 | End: 2021-05-20

## 2021-05-06 NOTE — PATIENT INSTRUCTIONS
Decrease  furosemide (Lasix) 20 mg to one tab daily as needed for rapid weight gain or swelling, call specialty care clinic if having to take 2 or more doses of furosemide with rapid weight gain, swelling or shortness of breath with exertion    Continue al

## 2021-05-06 NOTE — PROGRESS NOTES
Radha Otero 89 Patient Status:  No patient class for patient encounter    1975 MRN [id number]   Location [facility] Frank Wick is a 51year old female who fever, + fatigue  Respiratory:  Negative for hemoptysis and wheezing  Cardiovascular: negative for chest pain, exertional chest pressure/discomfort, near-syncope, orthopnea and palpitations  Gastrointestinal: negative for abdominal pain, melena, nausea and SR.    ECG: 10/6/2020 SR    Cardiac MRI :2/25/2021   EF 44% , without significant delayed enhancement     Echo: 2/11/2021 see care everywhere  INTERPRETATION:  1. Normal left ventricular ejection fraction. 2.  Normal diastolic filling parameters.   3. Norm follow up with Dr. Cheryl Bernal on 9/9 as scheduled; with  repeat ECHO just prior to visit  - begin cardiac rehab after COVID-19 vaccination, referal given  -reinforced to call with recurrent dizziness, lightheadedness, increased fatigue, wt gain, bloating, swelli 1 tablet (20 mg total) by mouth As Directed. Take furosemide (Lasix) 20 mg today, then on Thursday and Saturday, and Tuesday of the following week in the mornings. , Disp: 30 tablet, Rfl: 0  •  zolpidem 5 MG Oral Tab, Take 1 tablet (5 mg total) by mouth nig

## 2021-05-11 ENCOUNTER — PATIENT MESSAGE (OUTPATIENT)
Dept: CARDIOLOGY CLINIC | Facility: HOSPITAL | Age: 46
End: 2021-05-11

## 2021-05-11 NOTE — TELEPHONE ENCOUNTER
Completed [employer] disability papers , pt back working part time 24 hours a week and working 16 hours remotely. Tolerating well.  Plan to return to work full time 10/6/2021, she is seeing Dr. Sanjana Orozco on 9/9/2021

## 2021-05-20 RX ORDER — FUROSEMIDE 20 MG/1
TABLET ORAL
Qty: 30 TABLET | Refills: 0 | Status: SHIPPED | OUTPATIENT
Start: 2021-05-20 | End: 2022-06-01

## 2021-05-21 NOTE — TELEPHONE ENCOUNTER
Current Outpatient Medications   Medication Sig Dispense Refill   • rOPINIRole HCl 0.5 MG Oral Tab Take 0.5 mg by mouth nightly.          90 days

## 2021-05-24 RX ORDER — ROPINIROLE 0.5 MG/1
0.5 TABLET, FILM COATED ORAL NIGHTLY
Qty: 90 TABLET | Refills: 0 | Status: SHIPPED | OUTPATIENT
Start: 2021-05-24 | End: 2021-08-21

## 2021-05-25 RX ORDER — SPIRONOLACTONE 25 MG/1
12.5 TABLET ORAL DAILY
Qty: 90 TABLET | Refills: 2 | Status: SHIPPED | OUTPATIENT
Start: 2021-05-25

## 2021-05-25 RX ORDER — ATORVASTATIN CALCIUM 80 MG/1
80 TABLET, FILM COATED ORAL AT BEDTIME
Qty: 90 TABLET | Refills: 0 | Status: SHIPPED | OUTPATIENT
Start: 2021-05-25

## 2021-05-25 RX ORDER — CARVEDILOL 6.25 MG/1
6.25 TABLET ORAL 2 TIMES DAILY WITH MEALS
Qty: 90 TABLET | Refills: 3 | Status: SHIPPED | OUTPATIENT
Start: 2021-05-25 | End: 2022-05-25

## 2021-06-21 ENCOUNTER — PATIENT MESSAGE (OUTPATIENT)
Dept: CARDIOLOGY CLINIC | Facility: HOSPITAL | Age: 46
End: 2021-06-21

## 2021-06-22 NOTE — TELEPHONE ENCOUNTER
From: Jessica Koo  To: Chava Mancuso NP  Sent: 6/21/2021 9:08 AM CDT  Subject: Non-Urgent Medical Question    Paty been feeling a bit cloudy and extremely tired the last two days, im at work today and my heart was racing a bit. [employer].  came by

## 2021-06-22 NOTE — TELEPHONE ENCOUNTER
Spoke with patient. Her symptoms resolved, she is having increased sinus pain and pressure and will be calling her PCP today . Denies cp, padilla, heart racing or dizziness today.  Reinforced to call if having recurrent symptoms and to call her PCP for appointm

## 2021-08-02 ENCOUNTER — HOSPITAL ENCOUNTER (OUTPATIENT)
Dept: CV DIAGNOSTICS | Facility: HOSPITAL | Age: 46
Discharge: HOME | End: 2021-08-02
Attending: NURSE PRACTITIONER
Payer: COMMERCIAL

## 2021-08-02 DIAGNOSIS — I50.9 HEART FAILURE, UNSPECIFIED (HCC): ICD-10-CM

## 2021-08-02 PROCEDURE — 93306 TTE W/DOPPLER COMPLETE: CPT | Performed by: NURSE PRACTITIONER

## 2021-08-16 ENCOUNTER — OFFICE VISIT (OUTPATIENT)
Dept: CARDIOLOGY CLINIC | Facility: HOSPITAL | Age: 46
End: 2021-08-16
Attending: NURSE PRACTITIONER
Payer: COMMERCIAL

## 2021-08-16 VITALS
BODY MASS INDEX: 30 KG/M2 | HEART RATE: 51 BPM | DIASTOLIC BLOOD PRESSURE: 61 MMHG | OXYGEN SATURATION: 99 % | SYSTOLIC BLOOD PRESSURE: 103 MMHG | WEIGHT: 167 LBS

## 2021-08-16 DIAGNOSIS — I50.9 HEART FAILURE, UNSPECIFIED (HCC): ICD-10-CM

## 2021-08-16 DIAGNOSIS — I50.42 CHRONIC COMBINED SYSTOLIC AND DIASTOLIC CHF, NYHA CLASS 2 (HCC): Primary | Chronic | ICD-10-CM

## 2021-08-16 DIAGNOSIS — R00.1 SINUS BRADYCARDIA: ICD-10-CM

## 2021-08-16 DIAGNOSIS — R42 LIGHTHEADED: ICD-10-CM

## 2021-08-16 DIAGNOSIS — I10 BENIGN HYPERTENSION: Chronic | ICD-10-CM

## 2021-08-16 DIAGNOSIS — G47.33 OSA (OBSTRUCTIVE SLEEP APNEA): Chronic | ICD-10-CM

## 2021-08-16 DIAGNOSIS — R42 DIZZINESS: ICD-10-CM

## 2021-08-16 DIAGNOSIS — R53.82 CHRONIC FATIGUE: Chronic | ICD-10-CM

## 2021-08-16 PROBLEM — R00.2 PALPITATIONS: Chronic | Status: ACTIVE | Noted: 2021-08-16

## 2021-08-16 LAB
ANION GAP SERPL CALC-SCNC: 7 MMOL/L (ref 0–18)
BUN BLD-MCNC: 15 MG/DL (ref 7–18)
BUN/CREAT SERPL: 19.5 (ref 10–20)
CALCIUM BLD-MCNC: 9.6 MG/DL (ref 8.5–10.1)
CHLORIDE SERPL-SCNC: 111 MMOL/L (ref 98–112)
CO2 SERPL-SCNC: 23 MMOL/L (ref 21–32)
CREAT BLD-MCNC: 0.77 MG/DL
GLUCOSE BLD-MCNC: 96 MG/DL (ref 70–99)
NT-PROBNP SERPL-MCNC: 49 PG/ML (ref ?–125)
OSMOLALITY SERPL CALC.SUM OF ELEC: 293 MOSM/KG (ref 275–295)
PATIENT FASTING Y/N/NP: NO
POTASSIUM SERPL-SCNC: 4.1 MMOL/L (ref 3.5–5.1)
SODIUM SERPL-SCNC: 141 MMOL/L (ref 136–145)

## 2021-08-16 PROCEDURE — 36415 COLL VENOUS BLD VENIPUNCTURE: CPT | Performed by: NURSE PRACTITIONER

## 2021-08-16 PROCEDURE — 80048 BASIC METABOLIC PNL TOTAL CA: CPT | Performed by: NURSE PRACTITIONER

## 2021-08-16 PROCEDURE — 83880 ASSAY OF NATRIURETIC PEPTIDE: CPT | Performed by: NURSE PRACTITIONER

## 2021-08-16 PROCEDURE — 99214 OFFICE O/P EST MOD 30 MIN: CPT | Performed by: NURSE PRACTITIONER

## 2021-08-16 PROCEDURE — 99213 OFFICE O/P EST LOW 20 MIN: CPT | Performed by: NURSE PRACTITIONER

## 2021-08-16 RX ORDER — CARVEDILOL PHOSPHATE 10 MG/1
10 CAPSULE, EXTENDED RELEASE ORAL DAILY
Qty: 90 CAPSULE | Refills: 0 | Status: SHIPPED | OUTPATIENT
Start: 2021-08-16 | End: 2021-10-27

## 2021-08-16 NOTE — PROGRESS NOTES
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    ECG: 10/6/2020 SR    Cardiac MRI :2/25/2021   EF 44% , without significant delayed enhancement     Echo: 2/11/2021 see care everywhere  INTERPRETATION:  1. Normal left ventricular ejection fraction. 2.  Normal diastolic filling parameters.   3. Norm recurrent dizziness, lightheadedness, increased fatigue, wt gain, bloating, swelling, cp , padilla or palpitations.        Palpitations and orthostatic dizziness  - in sinus kirit 45 bpm, and borderline low bp  - decrease carvedilol to coreg CR 10 mg daily   -w 5  •  Albuterol Sulfate  (90 Base) MCG/ACT Inhalation Aero Soln, Inhale into the lungs every 6 (six) hours as needed for Wheezing.  , Disp: , Rfl:   •  carvedilol 12.5 MG Oral Tab, Take 6.25 mg by mouth 2 (two) times daily with meals.   , Disp: , Rfl

## 2021-08-16 NOTE — PATIENT INSTRUCTIONS
Stop carvedilol     Begin Coreg CR 10 mg tab daily starting tomorrow     Continue all your same medications    Call if having any dizziness, lightheadedness, heart racing, palpitations, chest pain, shortness of breath, coughing,  wheezing, swelling, weight

## 2021-08-18 ENCOUNTER — HOSPITAL ENCOUNTER (OUTPATIENT)
Dept: CV DIAGNOSTICS | Facility: HOSPITAL | Age: 46
Discharge: HOME | End: 2021-08-18
Attending: NURSE PRACTITIONER
Payer: COMMERCIAL

## 2021-08-18 DIAGNOSIS — R00.1 SINUS BRADYCARDIA: ICD-10-CM

## 2021-08-18 DIAGNOSIS — R42 LIGHTHEADED: ICD-10-CM

## 2021-08-18 PROCEDURE — 93244 EXT ECG>48HR<7D REV&INTERPJ: CPT | Performed by: NURSE PRACTITIONER

## 2021-08-20 NOTE — TELEPHONE ENCOUNTER
LOV 3/16/21  LR  5/24/21    Routed to [facility] for sign off. Order for MRI was faxed to [facility] in [place].    [phone] fax

## 2021-08-21 RX ORDER — ROPINIROLE 0.5 MG/1
TABLET, FILM COATED ORAL
Qty: 90 TABLET | Refills: 3 | Status: SHIPPED | OUTPATIENT
Start: 2021-08-21 | End: 2021-09-28

## 2021-08-23 ENCOUNTER — TELEPHONE (OUTPATIENT)
Dept: PULMONOLOGY | Facility: CLINIC | Age: 46
End: 2021-08-23

## 2021-08-23 DIAGNOSIS — G47.33 OSA (OBSTRUCTIVE SLEEP APNEA): Primary | ICD-10-CM

## 2021-08-23 NOTE — TELEPHONE ENCOUNTER
----- Message from Jose Quintana NP sent at 8/17/2021  7:37 AM CDT -----  Regarding: patient issues with cpap    Can you reach out to Rupesh Kay. 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  She is not wearing her cpap because she feels the air jermaine

## 2021-09-07 NOTE — TELEPHONE ENCOUNTER
Order faxed to [facility]. Fax confirmation received. Patient informed and to contact [facility] if she does not see settings changed remotely. Patient verbalized understanding.

## 2021-09-07 NOTE — TELEPHONE ENCOUNTER
Spoke with patient. Patient states she stopped using CPAP 2-3 months ago, states CPAP is blowing too much that she pulls mask off/wakes up without mask on. Current CPAP setting 9 CWP.

## 2021-09-09 ENCOUNTER — APPOINTMENT (OUTPATIENT)
Dept: CARDIOLOGY | Age: 46
End: 2021-09-09

## 2021-09-27 ENCOUNTER — TELEPHONE (OUTPATIENT)
Dept: CARDIOLOGY | Age: 46
End: 2021-09-27

## 2021-09-28 ENCOUNTER — OFFICE VISIT (OUTPATIENT)
Dept: CARDIOLOGY CLINIC | Facility: HOSPITAL | Age: 46
End: 2021-09-28
Attending: NURSE PRACTITIONER
Payer: COMMERCIAL

## 2021-09-28 ENCOUNTER — LAB ENCOUNTER (OUTPATIENT)
Dept: LAB | Facility: HOSPITAL | Age: 46
End: 2021-09-28
Attending: NURSE PRACTITIONER
Payer: COMMERCIAL

## 2021-09-28 VITALS
WEIGHT: 160 LBS | HEART RATE: 60 BPM | DIASTOLIC BLOOD PRESSURE: 80 MMHG | BODY MASS INDEX: 28 KG/M2 | OXYGEN SATURATION: 99 % | SYSTOLIC BLOOD PRESSURE: 116 MMHG

## 2021-09-28 DIAGNOSIS — R00.1 SINUS BRADYCARDIA: ICD-10-CM

## 2021-09-28 DIAGNOSIS — R42 DIZZINESS: ICD-10-CM

## 2021-09-28 DIAGNOSIS — G47.33 OSA (OBSTRUCTIVE SLEEP APNEA): Chronic | ICD-10-CM

## 2021-09-28 DIAGNOSIS — R53.82 CHRONIC FATIGUE: Chronic | ICD-10-CM

## 2021-09-28 DIAGNOSIS — I50.9 CHF (CONGESTIVE HEART FAILURE) (HCC): Primary | ICD-10-CM

## 2021-09-28 DIAGNOSIS — I50.42 CHRONIC COMBINED SYSTOLIC AND DIASTOLIC CHF, NYHA CLASS 2 (HCC): Primary | Chronic | ICD-10-CM

## 2021-09-28 LAB
ANION GAP SERPL CALC-SCNC: 5 MMOL/L (ref 0–18)
BUN BLD-MCNC: 13 MG/DL (ref 7–18)
BUN/CREAT SERPL: 15.9 (ref 10–20)
CALCIUM BLD-MCNC: 10.1 MG/DL (ref 8.5–10.1)
CHLORIDE SERPL-SCNC: 107 MMOL/L (ref 98–112)
CO2 SERPL-SCNC: 25 MMOL/L (ref 21–32)
CREAT BLD-MCNC: 0.82 MG/DL
GLUCOSE BLD-MCNC: 82 MG/DL (ref 70–99)
OSMOLALITY SERPL CALC.SUM OF ELEC: 283 MOSM/KG (ref 275–295)
PATIENT FASTING Y/N/NP: NO
POTASSIUM SERPL-SCNC: 4.1 MMOL/L (ref 3.5–5.1)
SODIUM SERPL-SCNC: 137 MMOL/L (ref 136–145)

## 2021-09-28 PROCEDURE — 99213 OFFICE O/P EST LOW 20 MIN: CPT | Performed by: NURSE PRACTITIONER

## 2021-09-28 PROCEDURE — 80048 BASIC METABOLIC PNL TOTAL CA: CPT | Performed by: NURSE PRACTITIONER

## 2021-09-28 PROCEDURE — 36415 COLL VENOUS BLD VENIPUNCTURE: CPT | Performed by: NURSE PRACTITIONER

## 2021-09-28 PROCEDURE — 99214 OFFICE O/P EST MOD 30 MIN: CPT | Performed by: NURSE PRACTITIONER

## 2021-09-28 NOTE — PROGRESS NOTES
Rahda Otero 89 Patient Status:  No patient class for patient encounter    1975 MRN [id number]   Location Yassine oHbson is a 51year old female who fatigue  Respiratory:  Negative for hemoptysis and wheezing  Cardiovascular: negative for chest pain, exertional chest pressure/discomfort, near-syncope, orthopnea   Gastrointestinal: negative for abdominal pain, melena, nausea and vomiting  Hematologic/ly 10-27-11-27/2020  SR, no arrhythmias , 6 triggered events , all with SR.    ECG: 10/6/2020 SR    Echo: 8/2/21 EF 47% , mild diffuse hypokinesis. Improved EF.      Cardiac MRI :2/25/2021   EF 44% , without significant delayed enhancement     Echo: 2/11/2021 .   -fatigue with orthostatic dizziness and persistent sinus kirit, HR 50-60 on decreased BB, on coreg CR 10 mg at HS for the last month.  Patient confirmed she is taking coreg cr 10 mg capsule twice daily in error.  -decrease coreg cr 10 mg capsule to once , see Dr. Galilea Hernandez next week first .       Current Outpatient Medications:   •  ROPINIROLE HCL 0.5 MG Oral Tab, TAKE 1 TABLET NIGHTLY, Disp: 90 tablet, Rfl: 3  •  Carvedilol Phosphate ER (COREG CR) 10 MG Oral Capsule SR 24 Hr, Take 1 capsule (10 mg total) by m

## 2021-09-28 NOTE — PATIENT INSTRUCTIONS
Confirm what dose of coreg cr you are taking at home, call with dose.      Call if having any dizziness, lightheadedness, heart racing, palpitations, chest pain, shortness of breath, coughing,  wheezing, swelling, weight gain,  or weakness    Weigh yoursel

## 2021-09-29 PROBLEM — R00.1 SINUS BRADYCARDIA: Status: ACTIVE | Noted: 2021-09-29

## 2021-09-29 RX ORDER — ROPINIROLE 0.5 MG/1
0.5 TABLET, FILM COATED ORAL NIGHTLY
Qty: 90 TABLET | Refills: 0 | Status: SHIPPED | OUTPATIENT
Start: 2021-09-29

## 2021-09-29 RX ORDER — ZOLPIDEM TARTRATE 5 MG/1
5 TABLET ORAL NIGHTLY PRN
Qty: 30 TABLET | Refills: 2 | Status: SHIPPED | OUTPATIENT
Start: 2021-09-29 | End: 2022-01-11

## 2021-10-07 ENCOUNTER — CARDPULM VISIT (OUTPATIENT)
Dept: CARDIAC REHAB | Facility: HOSPITAL | Age: 46
End: 2021-10-07
Attending: INTERNAL MEDICINE
Payer: COMMERCIAL

## 2021-10-11 ENCOUNTER — ORDER TRANSCRIPTION (OUTPATIENT)
Dept: CARDIAC REHAB | Facility: HOSPITAL | Age: 46
End: 2021-10-11

## 2021-10-11 DIAGNOSIS — I50.20 SYSTOLIC CONGESTIVE HEART FAILURE (HCC): Primary | ICD-10-CM

## 2021-10-13 ENCOUNTER — CARDPULM VISIT (OUTPATIENT)
Dept: CARDIAC REHAB | Facility: HOSPITAL | Age: 46
End: 2021-10-13
Attending: INTERNAL MEDICINE
Payer: COMMERCIAL

## 2021-10-13 PROCEDURE — 93798 PHYS/QHP OP CAR RHAB W/ECG: CPT

## 2021-10-14 NOTE — TELEPHONE ENCOUNTER
Unable to obtain download data via [employer]. Spoke with Butch Nicolas at [facility], states that patient's CPAP modem is not dialing out, unable to change settings remotely.  Butch Nicolas states that they have attempted to contact patient but unable to reach p

## 2021-10-18 ENCOUNTER — APPOINTMENT (OUTPATIENT)
Dept: CARDIAC REHAB | Facility: HOSPITAL | Age: 46
End: 2021-10-18
Attending: INTERNAL MEDICINE
Payer: COMMERCIAL

## 2021-10-18 RX ORDER — SPIRONOLACTONE 25 MG/1
25 TABLET ORAL DAILY
Qty: 90 TABLET | Refills: 1 | Status: SHIPPED | OUTPATIENT
Start: 2021-10-18 | End: 2021-11-30

## 2021-10-20 ENCOUNTER — APPOINTMENT (OUTPATIENT)
Dept: CARDIAC REHAB | Facility: HOSPITAL | Age: 46
End: 2021-10-20
Attending: INTERNAL MEDICINE
Payer: COMMERCIAL

## 2021-10-20 PROCEDURE — 93798 PHYS/QHP OP CAR RHAB W/ECG: CPT

## 2021-10-20 NOTE — TELEPHONE ENCOUNTER
Spoke with Vreo Kearney at [facility] to follow-up if patient's settings were changed. Vero Kearney states patient has not called them back yet. RN requesting [facility] to contact patient again. Vero Kearney states he try and call patient.

## 2021-10-22 ENCOUNTER — APPOINTMENT (OUTPATIENT)
Dept: CARDIAC REHAB | Facility: HOSPITAL | Age: 46
End: 2021-10-22
Attending: INTERNAL MEDICINE
Payer: COMMERCIAL

## 2021-10-25 ENCOUNTER — APPOINTMENT (OUTPATIENT)
Dept: CARDIAC REHAB | Facility: HOSPITAL | Age: 46
End: 2021-10-25
Attending: INTERNAL MEDICINE
Payer: COMMERCIAL

## 2021-10-27 ENCOUNTER — APPOINTMENT (OUTPATIENT)
Dept: CARDIAC REHAB | Facility: HOSPITAL | Age: 46
End: 2021-10-27
Attending: INTERNAL MEDICINE
Payer: COMMERCIAL

## 2021-10-27 RX ORDER — CARVEDILOL PHOSPHATE 10 MG/1
10 CAPSULE, EXTENDED RELEASE ORAL DAILY
Qty: 90 CAPSULE | Refills: 3 | Status: SHIPPED | OUTPATIENT
Start: 2021-10-27 | End: 2021-11-30

## 2021-10-29 ENCOUNTER — APPOINTMENT (OUTPATIENT)
Dept: CARDIAC REHAB | Facility: HOSPITAL | Age: 46
End: 2021-10-29
Attending: INTERNAL MEDICINE
Payer: COMMERCIAL

## 2021-11-01 ENCOUNTER — APPOINTMENT (OUTPATIENT)
Dept: CARDIAC REHAB | Facility: HOSPITAL | Age: 46
End: 2021-11-01
Attending: INTERNAL MEDICINE
Payer: COMMERCIAL

## 2021-11-03 ENCOUNTER — APPOINTMENT (OUTPATIENT)
Dept: CARDIAC REHAB | Facility: HOSPITAL | Age: 46
End: 2021-11-03
Attending: INTERNAL MEDICINE
Payer: COMMERCIAL

## 2021-11-03 NOTE — TELEPHONE ENCOUNTER
Spoke with Yuly at [facility] regarding changing CPAP settings. Yuly states they left message for patient on 10/14/21 and 10/20/21, patient has not called them back. Attempted to contact patient, left message to call back.

## 2021-11-05 ENCOUNTER — APPOINTMENT (OUTPATIENT)
Dept: CARDIAC REHAB | Facility: HOSPITAL | Age: 46
End: 2021-11-05
Attending: INTERNAL MEDICINE
Payer: COMMERCIAL

## 2021-11-08 ENCOUNTER — APPOINTMENT (OUTPATIENT)
Dept: CARDIAC REHAB | Facility: HOSPITAL | Age: 46
End: 2021-11-08
Attending: INTERNAL MEDICINE
Payer: COMMERCIAL

## 2021-11-10 ENCOUNTER — APPOINTMENT (OUTPATIENT)
Dept: CARDIAC REHAB | Facility: HOSPITAL | Age: 46
End: 2021-11-10
Attending: INTERNAL MEDICINE
Payer: COMMERCIAL

## 2021-11-12 ENCOUNTER — APPOINTMENT (OUTPATIENT)
Dept: CARDIAC REHAB | Facility: HOSPITAL | Age: 46
End: 2021-11-12
Attending: INTERNAL MEDICINE
Payer: COMMERCIAL

## 2021-11-15 ENCOUNTER — APPOINTMENT (OUTPATIENT)
Dept: CARDIAC REHAB | Facility: HOSPITAL | Age: 46
End: 2021-11-15
Attending: INTERNAL MEDICINE
Payer: COMMERCIAL

## 2021-11-17 ENCOUNTER — APPOINTMENT (OUTPATIENT)
Dept: CARDIAC REHAB | Facility: HOSPITAL | Age: 46
End: 2021-11-17
Attending: INTERNAL MEDICINE
Payer: COMMERCIAL

## 2021-11-19 ENCOUNTER — APPOINTMENT (OUTPATIENT)
Dept: CARDIAC REHAB | Facility: HOSPITAL | Age: 46
End: 2021-11-19
Attending: INTERNAL MEDICINE
Payer: COMMERCIAL

## 2021-11-19 ENCOUNTER — HOSPITAL ENCOUNTER (EMERGENCY)
Facility: HOSPITAL | Age: 46
Discharge: HOME | End: 2021-11-19
Attending: EMERGENCY MEDICINE
Payer: COMMERCIAL

## 2021-11-19 VITALS
OXYGEN SATURATION: 100 % | TEMPERATURE: 98 F | HEIGHT: 63 IN | WEIGHT: 149 LBS | BODY MASS INDEX: 26.4 KG/M2 | SYSTOLIC BLOOD PRESSURE: 112 MMHG | RESPIRATION RATE: 13 BRPM | HEART RATE: 59 BPM | DIASTOLIC BLOOD PRESSURE: 69 MMHG

## 2021-11-19 DIAGNOSIS — R42 POSTURAL DIZZINESS: Primary | ICD-10-CM

## 2021-11-19 PROCEDURE — 84443 ASSAY THYROID STIM HORMONE: CPT | Performed by: EMERGENCY MEDICINE

## 2021-11-19 PROCEDURE — 84481 FREE ASSAY (FT-3): CPT | Performed by: EMERGENCY MEDICINE

## 2021-11-19 PROCEDURE — 99285 EMERGENCY DEPT VISIT HI MDM: CPT

## 2021-11-19 PROCEDURE — 85025 COMPLETE CBC W/AUTO DIFF WBC: CPT | Performed by: EMERGENCY MEDICINE

## 2021-11-19 PROCEDURE — 84484 ASSAY OF TROPONIN QUANT: CPT | Performed by: EMERGENCY MEDICINE

## 2021-11-19 PROCEDURE — 93010 ELECTROCARDIOGRAM REPORT: CPT | Performed by: EMERGENCY MEDICINE

## 2021-11-19 PROCEDURE — 80048 BASIC METABOLIC PNL TOTAL CA: CPT | Performed by: EMERGENCY MEDICINE

## 2021-11-19 PROCEDURE — 96374 THER/PROPH/DIAG INJ IV PUSH: CPT

## 2021-11-19 PROCEDURE — 83735 ASSAY OF MAGNESIUM: CPT | Performed by: EMERGENCY MEDICINE

## 2021-11-19 PROCEDURE — 81003 URINALYSIS AUTO W/O SCOPE: CPT | Performed by: EMERGENCY MEDICINE

## 2021-11-19 PROCEDURE — 84439 ASSAY OF FREE THYROXINE: CPT | Performed by: EMERGENCY MEDICINE

## 2021-11-19 PROCEDURE — 83880 ASSAY OF NATRIURETIC PEPTIDE: CPT | Performed by: EMERGENCY MEDICINE

## 2021-11-19 PROCEDURE — 93005 ELECTROCARDIOGRAM TRACING: CPT

## 2021-11-19 PROCEDURE — 96361 HYDRATE IV INFUSION ADD-ON: CPT

## 2021-11-19 RX ORDER — ONDANSETRON 2 MG/ML
4 INJECTION INTRAMUSCULAR; INTRAVENOUS ONCE
Status: COMPLETED | OUTPATIENT
Start: 2021-11-19 | End: 2021-11-19

## 2021-11-19 NOTE — ED PROVIDER NOTES
Patient Seen in: [facility] AND [facility] Emergency Department      History   Patient presents with:  Dizziness    Stated Complaint: dizzy    Subjective:   HPI  Patient is a 14-year-old female history of cardiomyopathy, hypertension, sleep apnea, fibroid tumor moist.   Eyes:      Extraocular Movements: Extraocular movements intact. Conjunctiva/sclera: Conjunctivae normal.      Pupils: Pupils are equal, round, and reactive to light. Cardiovascular:      Rate and Rhythm: Normal rate and regular rhythm.    Pu FREE T3 (TRIIODOTHYRONINE)   CBC W/ DIFFERENTIAL     EKG    Rate, intervals and axes as noted on EKG Report. Rate: 77  Rhythm: Sinus Rhythm  Reading: Normal sinus rhythm, normal axis, normal intervals, no ST or T wave changes.                        MDM

## 2021-11-19 NOTE — ED INITIAL ASSESSMENT (HPI)
Pt comes to ER with c/o every time she stood up from her work desk she felt dizzy. Pt reports she felt she might pass out. Denies chest pain and sob.

## 2021-11-22 ENCOUNTER — APPOINTMENT (OUTPATIENT)
Dept: CARDIAC REHAB | Facility: HOSPITAL | Age: 46
End: 2021-11-22
Attending: INTERNAL MEDICINE
Payer: COMMERCIAL

## 2021-11-24 ENCOUNTER — APPOINTMENT (OUTPATIENT)
Dept: CARDIAC REHAB | Facility: HOSPITAL | Age: 46
End: 2021-11-24
Attending: INTERNAL MEDICINE
Payer: COMMERCIAL

## 2021-11-26 ENCOUNTER — APPOINTMENT (OUTPATIENT)
Dept: CARDIAC REHAB | Facility: HOSPITAL | Age: 46
End: 2021-11-26
Attending: INTERNAL MEDICINE
Payer: COMMERCIAL

## 2021-11-29 ENCOUNTER — APPOINTMENT (OUTPATIENT)
Dept: CARDIAC REHAB | Facility: HOSPITAL | Age: 46
End: 2021-11-29
Attending: INTERNAL MEDICINE
Payer: COMMERCIAL

## 2021-11-30 ENCOUNTER — OFFICE VISIT (OUTPATIENT)
Dept: CARDIOLOGY CLINIC | Facility: HOSPITAL | Age: 46
End: 2021-11-30
Attending: NURSE PRACTITIONER
Payer: COMMERCIAL

## 2021-11-30 ENCOUNTER — LAB ENCOUNTER (OUTPATIENT)
Dept: LAB | Facility: HOSPITAL | Age: 46
End: 2021-11-30
Attending: NURSE PRACTITIONER
Payer: COMMERCIAL

## 2021-11-30 VITALS
HEART RATE: 78 BPM | WEIGHT: 153 LBS | OXYGEN SATURATION: 100 % | DIASTOLIC BLOOD PRESSURE: 74 MMHG | BODY MASS INDEX: 27 KG/M2 | SYSTOLIC BLOOD PRESSURE: 110 MMHG

## 2021-11-30 DIAGNOSIS — I50.22 CHRONIC HFREF (HEART FAILURE WITH REDUCED EJECTION FRACTION) (HCC): Primary | ICD-10-CM

## 2021-11-30 DIAGNOSIS — I42.8 NICM (NONISCHEMIC CARDIOMYOPATHY) (HCC): Chronic | ICD-10-CM

## 2021-11-30 DIAGNOSIS — I50.42 CHRONIC COMBINED SYSTOLIC AND DIASTOLIC CHF, NYHA CLASS 2 (HCC): Chronic | ICD-10-CM

## 2021-11-30 DIAGNOSIS — I50.22 CHRONIC HFREF (HEART FAILURE WITH REDUCED EJECTION FRACTION) (HCC): ICD-10-CM

## 2021-11-30 DIAGNOSIS — R42 DIZZINESS: ICD-10-CM

## 2021-11-30 DIAGNOSIS — G47.33 OSA (OBSTRUCTIVE SLEEP APNEA): Chronic | ICD-10-CM

## 2021-11-30 DIAGNOSIS — R00.2 PALPITATIONS: ICD-10-CM

## 2021-11-30 PROCEDURE — 80048 BASIC METABOLIC PNL TOTAL CA: CPT

## 2021-11-30 PROCEDURE — 99214 OFFICE O/P EST MOD 30 MIN: CPT | Performed by: NURSE PRACTITIONER

## 2021-11-30 PROCEDURE — 36415 COLL VENOUS BLD VENIPUNCTURE: CPT

## 2021-11-30 PROCEDURE — 99213 OFFICE O/P EST LOW 20 MIN: CPT | Performed by: NURSE PRACTITIONER

## 2021-11-30 RX ORDER — CARVEDILOL PHOSPHATE 10 MG/1
10 CAPSULE, EXTENDED RELEASE ORAL NIGHTLY
Qty: 90 CAPSULE | Refills: 3 | COMMUNITY
Start: 2021-11-30

## 2021-11-30 NOTE — PROGRESS NOTES
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      Review of Systems:  Constitutional: negative for chills or fever, + fatigue  Respiratory:  Negative for hemoptysis and wheezing  Cardiovascular: negative for chest pain, exertional chest pressure/discomfort,  orthopnea , see above  Gastrointest 9/28/21: SB 52 bpm, pr 0.10, qrs 0.08, qt 0.40  8/16/21   SB 45 bpm,   2/17/2021  SB 59 bpm, pr  0.10 qrs 0.08 qt 0.38     30 day event monitor: 10/27-11/27/20  SR, no arrhythmias , 6 triggered events , all with SR.    ECG: 10/6/2020 SR    Echo: 8/2/21 E fatigued in am, sluggish thru the day, then significant increased fatigue mid afternoon each day at work.   Wt still trending down another 7 # in the last 2 month, down 20 # in the last year eating less, decreased appetite  -not using cpap , needs setting a wheezing, swelling, weight gain,  or weakness    Weigh yourself daily in the morning before breakfast, call if gaining 3 lbs or more overnight or more than 5 lbs in one week.     Follow 2000 mg sodium restricted , heart healthy diet, Keep daily fluids at 48

## 2021-11-30 NOTE — PATIENT INSTRUCTIONS
Stop spironolactone     Switch to coreg CR 10 mg capsule at bedtime    Continue all your same medications    Call if having any dizziness, lightheadedness, heart racing, palpitations, chest pain, shortness of breath, coughing,  wheezing, swelling, weight g

## 2021-12-01 ENCOUNTER — APPOINTMENT (OUTPATIENT)
Dept: CARDIAC REHAB | Facility: HOSPITAL | Age: 46
End: 2021-12-01
Attending: INTERNAL MEDICINE
Payer: COMMERCIAL

## 2021-12-02 ENCOUNTER — APPOINTMENT (OUTPATIENT)
Dept: CARDIAC REHAB | Facility: HOSPITAL | Age: 46
End: 2021-12-02
Attending: INTERNAL MEDICINE
Payer: COMMERCIAL

## 2021-12-03 ENCOUNTER — APPOINTMENT (OUTPATIENT)
Dept: CARDIAC REHAB | Facility: HOSPITAL | Age: 46
End: 2021-12-03
Attending: INTERNAL MEDICINE
Payer: COMMERCIAL

## 2021-12-06 ENCOUNTER — APPOINTMENT (OUTPATIENT)
Dept: CARDIAC REHAB | Facility: HOSPITAL | Age: 46
End: 2021-12-06
Attending: INTERNAL MEDICINE
Payer: COMMERCIAL

## 2021-12-08 ENCOUNTER — APPOINTMENT (OUTPATIENT)
Dept: CARDIAC REHAB | Facility: HOSPITAL | Age: 46
End: 2021-12-08
Attending: INTERNAL MEDICINE
Payer: COMMERCIAL

## 2021-12-09 ENCOUNTER — APPOINTMENT (OUTPATIENT)
Dept: CARDIAC REHAB | Facility: HOSPITAL | Age: 46
End: 2021-12-09
Attending: INTERNAL MEDICINE
Payer: COMMERCIAL

## 2021-12-10 ENCOUNTER — APPOINTMENT (OUTPATIENT)
Dept: CARDIAC REHAB | Facility: HOSPITAL | Age: 46
End: 2021-12-10
Attending: INTERNAL MEDICINE
Payer: COMMERCIAL

## 2021-12-13 ENCOUNTER — APPOINTMENT (OUTPATIENT)
Dept: CARDIAC REHAB | Facility: HOSPITAL | Age: 46
End: 2021-12-13
Attending: INTERNAL MEDICINE
Payer: COMMERCIAL

## 2021-12-15 ENCOUNTER — APPOINTMENT (OUTPATIENT)
Dept: CARDIAC REHAB | Facility: HOSPITAL | Age: 46
End: 2021-12-15
Attending: INTERNAL MEDICINE
Payer: COMMERCIAL

## 2021-12-16 ENCOUNTER — APPOINTMENT (OUTPATIENT)
Dept: CARDIAC REHAB | Facility: HOSPITAL | Age: 46
End: 2021-12-16
Attending: INTERNAL MEDICINE
Payer: COMMERCIAL

## 2021-12-17 ENCOUNTER — APPOINTMENT (OUTPATIENT)
Dept: CARDIAC REHAB | Facility: HOSPITAL | Age: 46
End: 2021-12-17
Attending: INTERNAL MEDICINE
Payer: COMMERCIAL

## 2021-12-20 ENCOUNTER — APPOINTMENT (OUTPATIENT)
Dept: CARDIAC REHAB | Facility: HOSPITAL | Age: 46
End: 2021-12-20
Attending: INTERNAL MEDICINE
Payer: COMMERCIAL

## 2021-12-22 ENCOUNTER — APPOINTMENT (OUTPATIENT)
Dept: CARDIAC REHAB | Facility: HOSPITAL | Age: 46
End: 2021-12-22
Attending: INTERNAL MEDICINE
Payer: COMMERCIAL

## 2021-12-23 ENCOUNTER — APPOINTMENT (OUTPATIENT)
Dept: CARDIAC REHAB | Facility: HOSPITAL | Age: 46
End: 2021-12-23
Attending: INTERNAL MEDICINE
Payer: COMMERCIAL

## 2021-12-24 ENCOUNTER — APPOINTMENT (OUTPATIENT)
Dept: CARDIAC REHAB | Facility: HOSPITAL | Age: 46
End: 2021-12-24
Attending: INTERNAL MEDICINE
Payer: COMMERCIAL

## 2021-12-27 ENCOUNTER — APPOINTMENT (OUTPATIENT)
Dept: CARDIAC REHAB | Facility: HOSPITAL | Age: 46
End: 2021-12-27
Attending: INTERNAL MEDICINE
Payer: COMMERCIAL

## 2021-12-29 ENCOUNTER — APPOINTMENT (OUTPATIENT)
Dept: CARDIAC REHAB | Facility: HOSPITAL | Age: 46
End: 2021-12-29
Attending: INTERNAL MEDICINE
Payer: COMMERCIAL

## 2021-12-30 ENCOUNTER — APPOINTMENT (OUTPATIENT)
Dept: CARDIAC REHAB | Facility: HOSPITAL | Age: 46
End: 2021-12-30
Attending: INTERNAL MEDICINE
Payer: COMMERCIAL

## 2021-12-31 ENCOUNTER — APPOINTMENT (OUTPATIENT)
Dept: CARDIAC REHAB | Facility: HOSPITAL | Age: 46
End: 2021-12-31
Attending: INTERNAL MEDICINE
Payer: COMMERCIAL

## 2022-01-03 ENCOUNTER — APPOINTMENT (OUTPATIENT)
Dept: CARDIAC REHAB | Facility: HOSPITAL | Age: 47
End: 2022-01-03
Attending: INTERNAL MEDICINE
Payer: COMMERCIAL

## 2022-01-05 ENCOUNTER — APPOINTMENT (OUTPATIENT)
Dept: CARDIAC REHAB | Facility: HOSPITAL | Age: 47
End: 2022-01-05
Attending: INTERNAL MEDICINE
Payer: COMMERCIAL

## 2022-01-06 ENCOUNTER — APPOINTMENT (OUTPATIENT)
Dept: CARDIAC REHAB | Facility: HOSPITAL | Age: 47
End: 2022-01-06
Attending: INTERNAL MEDICINE
Payer: COMMERCIAL

## 2022-01-07 ENCOUNTER — APPOINTMENT (OUTPATIENT)
Dept: CARDIAC REHAB | Facility: HOSPITAL | Age: 47
End: 2022-01-07
Attending: INTERNAL MEDICINE
Payer: COMMERCIAL

## 2022-01-10 ENCOUNTER — APPOINTMENT (OUTPATIENT)
Dept: CARDIAC REHAB | Facility: HOSPITAL | Age: 47
End: 2022-01-10
Attending: INTERNAL MEDICINE
Payer: COMMERCIAL

## 2022-01-11 ENCOUNTER — PATIENT MESSAGE (OUTPATIENT)
Dept: CARDIOLOGY CLINIC | Facility: HOSPITAL | Age: 47
End: 2022-01-11

## 2022-01-11 ENCOUNTER — TELEPHONE (OUTPATIENT)
Dept: CARDIOLOGY CLINIC | Facility: HOSPITAL | Age: 47
End: 2022-01-11

## 2022-01-11 NOTE — TELEPHONE ENCOUNTER
Called [facility] to obtain monitor results. Received and reviewed by Kiera Horn and sent to scan.

## 2022-01-12 ENCOUNTER — APPOINTMENT (OUTPATIENT)
Dept: CARDIAC REHAB | Facility: HOSPITAL | Age: 47
End: 2022-01-12
Attending: INTERNAL MEDICINE
Payer: COMMERCIAL

## 2022-01-13 RX ORDER — ZOLPIDEM TARTRATE 5 MG/1
5 TABLET ORAL NIGHTLY PRN
Qty: 30 TABLET | Refills: 2 | Status: SHIPPED | OUTPATIENT
Start: 2022-01-13

## 2022-01-27 NOTE — TELEPHONE ENCOUNTER
COVID-19 Screening:    • Does the patient OR patient’s household members have any of the following symptoms?  o Temperature: Fever ?100.0°F or ?37.8°C?  No  o Respiratory symptoms: New or worsening cough, shortness of breath, difficulty breathing, or sore throat? No  o GI symptoms: New onset of nausea, vomiting or diarrhea?  No  o Miscellaneous: New onset of loss of taste or smell, chills, repeated shaking with chills, muscle pain, headache, congestion or runny nose?  No  • Has the patient or a household member tested positive for COVID-19 in the last 14 days?  No  • Has the patient or a household member been tested for COVID-19 and are waiting for the results?  No     From: Adolph Schultz  Sent: 1/27/2022 12:08 PM CST  To: [facility] Clinical Pool  Subject: Cardiac monitor results    Would you be able to answer the following questions:  1) What is my diagnosis?   2)Would an earlier intervention have made a

## 2022-01-28 ENCOUNTER — LAB ENCOUNTER (OUTPATIENT)
Dept: LAB | Facility: HOSPITAL | Age: 47
End: 2022-01-28
Attending: INTERNAL MEDICINE
Payer: COMMERCIAL

## 2022-01-28 ENCOUNTER — OFFICE VISIT (OUTPATIENT)
Dept: INTERNAL MEDICINE CLINIC | Facility: CLINIC | Age: 47
End: 2022-01-28
Payer: COMMERCIAL

## 2022-01-28 VITALS
HEART RATE: 91 BPM | HEIGHT: 63 IN | DIASTOLIC BLOOD PRESSURE: 82 MMHG | BODY MASS INDEX: 25.87 KG/M2 | WEIGHT: 146 LBS | SYSTOLIC BLOOD PRESSURE: 118 MMHG

## 2022-01-28 DIAGNOSIS — R53.83 FATIGUE, UNSPECIFIED TYPE: ICD-10-CM

## 2022-01-28 DIAGNOSIS — R53.83 FATIGUE, UNSPECIFIED TYPE: Primary | ICD-10-CM

## 2022-01-28 DIAGNOSIS — Z76.89 ENCOUNTER TO ESTABLISH CARE: ICD-10-CM

## 2022-01-28 PROBLEM — E05.90 SUBCLINICAL HYPERTHYROIDISM: Status: ACTIVE | Noted: 2021-11-01

## 2022-01-28 PROBLEM — I50.20 SYSTOLIC CHF (HCC): Status: ACTIVE | Noted: 2020-10-01

## 2022-01-28 LAB
ALBUMIN SERPL-MCNC: 4.3 G/DL (ref 3.4–5)
ALBUMIN/GLOB SERPL: 1.1 {RATIO} (ref 1–2)
ALP LIVER SERPL-CCNC: 79 U/L
ALT SERPL-CCNC: 51 U/L
AST SERPL-CCNC: 23 U/L (ref 15–37)
BILIRUB SERPL-MCNC: 0.6 MG/DL (ref 0.1–2)
BUN BLD-MCNC: 15 MG/DL (ref 7–18)
BUN/CREAT SERPL: 20 (ref 10–20)
CALCIUM BLD-MCNC: 10 MG/DL (ref 8.5–10.1)
CHLORIDE SERPL-SCNC: 108 MMOL/L (ref 98–112)
CO2 SERPL-SCNC: 25 MMOL/L (ref 21–32)
CREAT BLD-MCNC: 0.75 MG/DL
DEPRECATED RDW RBC AUTO: 43.3 FL (ref 35.1–46.3)
ERYTHROCYTE [DISTWIDTH] IN BLOOD BY AUTOMATED COUNT: 13.2 % (ref 11–15)
FASTING STATUS PATIENT QL REPORTED: YES
GLOBULIN PLAS-MCNC: 3.8 G/DL (ref 2.8–4.4)
GLUCOSE BLD-MCNC: 96 MG/DL (ref 70–99)
HCT VFR BLD AUTO: 39 %
HGB BLD-MCNC: 12.9 G/DL
MCH RBC QN AUTO: 29.9 PG (ref 26–34)
MCHC RBC AUTO-ENTMCNC: 33.1 G/DL (ref 31–37)
MCV RBC AUTO: 90.5 FL
OSMOLALITY SERPL CALC.SUM OF ELEC: 291 MOSM/KG (ref 275–295)
PLATELET # BLD AUTO: 327 10(3)UL (ref 150–450)
POTASSIUM SERPL-SCNC: 4.4 MMOL/L (ref 3.5–5.1)
PROT SERPL-MCNC: 8.1 G/DL (ref 6.4–8.2)
RBC # BLD AUTO: 4.31 X10(6)UL
SODIUM SERPL-SCNC: 140 MMOL/L (ref 136–145)
TSI SER-ACNC: 0.41 MIU/ML (ref 0.36–3.74)
WBC # BLD AUTO: 8.7 X10(3) UL (ref 4–11)

## 2022-01-28 PROCEDURE — 3008F BODY MASS INDEX DOCD: CPT | Performed by: INTERNAL MEDICINE

## 2022-01-28 PROCEDURE — 3074F SYST BP LT 130 MM HG: CPT | Performed by: INTERNAL MEDICINE

## 2022-01-28 PROCEDURE — 3079F DIAST BP 80-89 MM HG: CPT | Performed by: INTERNAL MEDICINE

## 2022-01-28 PROCEDURE — 85027 COMPLETE CBC AUTOMATED: CPT

## 2022-01-28 PROCEDURE — 99203 OFFICE O/P NEW LOW 30 MIN: CPT | Performed by: INTERNAL MEDICINE

## 2022-01-28 PROCEDURE — 80053 COMPREHEN METABOLIC PANEL: CPT

## 2022-01-28 PROCEDURE — 84443 ASSAY THYROID STIM HORMONE: CPT

## 2022-01-28 PROCEDURE — 36415 COLL VENOUS BLD VENIPUNCTURE: CPT

## 2022-01-28 NOTE — PROGRESS NOTES
Vickey Lester is a 49year old female who presents this afternoon for her initial visit to establish ongoing primary care.   HPI:   Previous physicians were at [facility], but she expresses dissatisfaction with care received there and legal action is alejandro 2 (two) times daily. (Patient taking differently: Take 1 tablet by mouth 2 (two) times daily.  Take 1/2 tablet every morning and 1 tablet every evening) 180 tablet 0   • FUROSEMIDE 20 MG Oral Tab PLEASE SEE ATTACHED FOR DETAILED DIRECTIONS (Patient taking d palpitations with recent Holter monitor normal.  No chest pain  GI: Occasional nausea with certain foods.   No anorexia heartburn dysphagia vomiting abdominal pain diarrhea constipation or rectal bleeding  : No urinary frequency dysuria or hematuria  [facility]

## 2022-02-01 RX ORDER — ZOLPIDEM TARTRATE 5 MG/1
5 TABLET ORAL NIGHTLY PRN
Qty: 30 TABLET | Refills: 2 | OUTPATIENT
Start: 2022-02-01

## 2022-02-01 NOTE — TELEPHONE ENCOUNTER
Last office visit: 3/16/21  Last refill: 1/13/22    Called [facility], verified they have a prescription that was send 1/13/22 with 2 additional refills.  Please disregard this request

## 2022-02-16 ENCOUNTER — PATIENT MESSAGE (OUTPATIENT)
Dept: CARDIOLOGY CLINIC | Facility: HOSPITAL | Age: 47
End: 2022-02-16

## 2022-02-16 NOTE — TELEPHONE ENCOUNTER
From: Theola Dec  To: Danii Lei NP  Sent: 2/16/2022 10:21 AM CST  Subject: Echocardiogram    I had my echo completed this morning. Because it is the Cardiology department will I be able to see the results on [employer]? or do I need a [facility] [employer] account? I'm still super fatigued. Working full time. It's wearing me down.

## 2022-02-22 ENCOUNTER — TELEPHONE (OUTPATIENT)
Dept: CARDIOLOGY CLINIC | Facility: HOSPITAL | Age: 47
End: 2022-02-22

## 2022-02-22 RX ORDER — ROPINIROLE 0.5 MG/1
TABLET, FILM COATED ORAL
Qty: 90 TABLET | Refills: 0 | Status: SHIPPED | OUTPATIENT
Start: 2022-02-22

## 2022-02-22 NOTE — TELEPHONE ENCOUNTER
Rx request for Ropinirole HCI 0.5 mg, please review and sign off if appropriate. Thank you.     Last seen: 3/16/21  Last refill: 9/29/21

## 2022-02-22 NOTE — TELEPHONE ENCOUNTER
Contacted [facility] for report. Hardik Bell NP  P [facility] Specialty Care Clinical Pool  Can you call to get the most recent echo report for Wagner Burch from [address]    Thanks,   Mike Garcia

## 2022-02-28 ENCOUNTER — PATIENT MESSAGE (OUTPATIENT)
Dept: CARDIOLOGY CLINIC | Facility: HOSPITAL | Age: 47
End: 2022-02-28

## 2022-02-28 NOTE — TELEPHONE ENCOUNTER
Echo results from 2/16/2022 at [address] ( see media)   EF 55%, mildly decreased global LV systolic function, LV diastolic function is normal. Mild to moderate mitral regurg. Mild LA dilation.

## 2022-03-03 ENCOUNTER — TELEPHONE (OUTPATIENT)
Dept: NEUROLOGY | Facility: CLINIC | Age: 47
End: 2022-03-03

## 2022-03-03 ENCOUNTER — OFFICE VISIT (OUTPATIENT)
Dept: NEUROLOGY | Facility: CLINIC | Age: 47
End: 2022-03-03
Payer: COMMERCIAL

## 2022-03-03 VITALS
HEIGHT: 63 IN | DIASTOLIC BLOOD PRESSURE: 64 MMHG | WEIGHT: 146 LBS | OXYGEN SATURATION: 98 % | HEART RATE: 76 BPM | SYSTOLIC BLOOD PRESSURE: 116 MMHG | BODY MASS INDEX: 25.87 KG/M2

## 2022-03-03 DIAGNOSIS — G43.819 OTHER MIGRAINE WITHOUT STATUS MIGRAINOSUS, INTRACTABLE: Primary | ICD-10-CM

## 2022-03-03 PROCEDURE — 3074F SYST BP LT 130 MM HG: CPT | Performed by: OTHER

## 2022-03-03 PROCEDURE — 3008F BODY MASS INDEX DOCD: CPT | Performed by: OTHER

## 2022-03-03 PROCEDURE — 3078F DIAST BP <80 MM HG: CPT | Performed by: OTHER

## 2022-03-03 PROCEDURE — 99204 OFFICE O/P NEW MOD 45 MIN: CPT | Performed by: OTHER

## 2022-03-03 RX ORDER — TOPIRAMATE 25 MG/1
TABLET ORAL
Qty: 120 TABLET | Refills: 3 | Status: SHIPPED | OUTPATIENT
Start: 2022-03-03

## 2022-03-03 NOTE — TELEPHONE ENCOUNTER
[employer] for authorization of approval for MRI brain w/wo cpt code 26958. Authorization # [id number] valid 03/03/2022 - 05/01/2022. Pt. Informed. Transferred call to scheduling for appt.

## 2022-03-14 RX ORDER — VENLAFAXINE 25 MG/1
25 TABLET ORAL NIGHTLY
Qty: 30 TABLET | Refills: 1 | Status: SHIPPED | OUTPATIENT
Start: 2022-03-14

## 2022-03-15 RX ORDER — ZOLPIDEM TARTRATE 5 MG/1
5 TABLET ORAL NIGHTLY PRN
Qty: 30 TABLET | Refills: 2 | Status: SHIPPED | OUTPATIENT
Start: 2022-03-15

## 2022-03-15 NOTE — TELEPHONE ENCOUNTER
Rx request for Zolpidem 5 mg, please review and sign off if appropriate. Thank you.     Last seen: 3/16/21  Last refill: 1/13/22

## 2022-03-22 ENCOUNTER — HOSPITAL ENCOUNTER (OUTPATIENT)
Dept: MRI IMAGING | Age: 47
Discharge: HOME | End: 2022-03-22
Attending: Other
Payer: COMMERCIAL

## 2022-03-22 ENCOUNTER — TELEPHONE (OUTPATIENT)
Dept: NEUROLOGY | Facility: CLINIC | Age: 47
End: 2022-03-22

## 2022-03-22 DIAGNOSIS — G43.819 OTHER MIGRAINE WITHOUT STATUS MIGRAINOSUS, INTRACTABLE: ICD-10-CM

## 2022-03-22 PROCEDURE — A9575 INJ GADOTERATE MEGLUMI 0.1ML: HCPCS | Performed by: OTHER

## 2022-03-22 PROCEDURE — 70553 MRI BRAIN STEM W/O & W/DYE: CPT | Performed by: OTHER

## 2022-03-22 NOTE — TELEPHONE ENCOUNTER
Please call the patient and tell her I reviewed the MRI of the brain which reveals no changes. This is good news.

## 2022-04-06 RX ORDER — VENLAFAXINE 25 MG/1
25 TABLET ORAL NIGHTLY
Qty: 90 TABLET | Refills: 1 | Status: SHIPPED | OUTPATIENT
Start: 2022-04-06 | End: 2022-04-08

## 2022-04-06 NOTE — TELEPHONE ENCOUNTER
Venlafaxine 25mg requested thru mail order. mychart sent to patient to clarify mail order request.  Await response.

## 2022-04-06 NOTE — TELEPHONE ENCOUNTER
Refill passed per [facility], [facility] protocol. Requested Prescriptions   Pending Prescriptions Disp Refills    venlafaxine 25 MG Oral Tab 90 tablet 1     Sig: Take 1 tablet (25 mg total) by mouth nightly.         Psychiatric Non-Scheduled (Anti-Anxiety) Passed - 4/6/2022  1:23 PM        Passed - Appointment in last 6 or next 3 months             Recent Outpatient Visits              1 month ago Other migraine without status migrainosus, intractable    [facility] Luis Mix MD    Office Visit    2 months ago Fatigue, unspecified type    [place] [email or web address] [place], [facility], [address], Trina Campbell MD    Office Visit    4 months ago Chronic HFrEF (heart failure with reduced ejection fraction) [facility])    [address], NP    Office Visit    6 months ago Chronic combined systolic and diastolic CHF, NYHA class 2 [facility])    [address], NP    Office Visit    7 months ago Chronic combined systolic and diastolic CHF, NYHA class 2 [facility])    [address], NP    Office Visit

## 2022-04-08 RX ORDER — VENLAFAXINE 25 MG/1
25 TABLET ORAL NIGHTLY
Qty: 90 TABLET | Refills: 1 | Status: SHIPPED | OUTPATIENT
Start: 2022-04-08

## 2022-04-08 NOTE — TELEPHONE ENCOUNTER
Refill passed per [facility] protocol.    Requested Prescriptions   Pending Prescriptions Disp Refills    VENLAFAXINE 25 MG Oral Tab [Pharmacy Med Name: VENLAFAXINE HCL 25 MG TABLET] 30 tablet 1     Sig: TAKE 1 TABLET BY MOUTH EVERY DAY AT NIGHT        Psychiatric Non-Scheduled (Anti-Anxiety) Passed - 4/8/2022 10:31 AM        Passed - Appointment in last 6 or next 3 months               Recent Outpatient Visits              1 month ago Other migraine without status migrainosus, intractable    [facility] Ally Lucero MD    Office Visit    2 months ago Fatigue, unspecified type    [place] [facility], [facility], [address], Mattie Navarro MD    Office Visit    4 months ago Chronic HFrEF (heart failure with reduced ejection fraction) [facility])    [address], NP    Office Visit    6 months ago Chronic combined systolic and diastolic CHF, NYHA class 2 [facility])    [address], NP    Office Visit    7 months ago Chronic combined systolic and diastolic CHF, NYHA class 2 [facility])    [address], NP    Office Visit

## 2022-05-03 ENCOUNTER — PATIENT MESSAGE (OUTPATIENT)
Dept: CARDIOLOGY CLINIC | Facility: HOSPITAL | Age: 47
End: 2022-05-03

## 2022-05-03 NOTE — TELEPHONE ENCOUNTER
From: Tabatha Goel  Sent: 5/3/2022 4:17 PM CDT  To: [facility] Clinical Pool  Subject: BP Elevated    I can be there at 3 tomorrow for sure

## 2022-05-04 ENCOUNTER — TELEPHONE (OUTPATIENT)
Dept: CARDIOLOGY CLINIC | Facility: HOSPITAL | Age: 47
End: 2022-05-04

## 2022-05-04 ENCOUNTER — OFFICE VISIT (OUTPATIENT)
Dept: CARDIOLOGY CLINIC | Facility: HOSPITAL | Age: 47
End: 2022-05-04
Attending: NURSE PRACTITIONER
Payer: COMMERCIAL

## 2022-05-04 VITALS
HEART RATE: 78 BPM | OXYGEN SATURATION: 96 % | SYSTOLIC BLOOD PRESSURE: 120 MMHG | BODY MASS INDEX: 25 KG/M2 | WEIGHT: 138.81 LBS | DIASTOLIC BLOOD PRESSURE: 80 MMHG

## 2022-05-04 DIAGNOSIS — G47.33 OSA (OBSTRUCTIVE SLEEP APNEA): Chronic | ICD-10-CM

## 2022-05-04 DIAGNOSIS — I10 ESSENTIAL HYPERTENSION: ICD-10-CM

## 2022-05-04 DIAGNOSIS — R00.2 PALPITATIONS: Chronic | ICD-10-CM

## 2022-05-04 DIAGNOSIS — I50.22 CHRONIC HFREF (HEART FAILURE WITH REDUCED EJECTION FRACTION) (HCC): Primary | ICD-10-CM

## 2022-05-04 DIAGNOSIS — I42.8 NICM (NONISCHEMIC CARDIOMYOPATHY) (HCC): Chronic | ICD-10-CM

## 2022-05-04 DIAGNOSIS — I50.42 CHRONIC COMBINED SYSTOLIC AND DIASTOLIC CHF, NYHA CLASS 2 (HCC): Chronic | ICD-10-CM

## 2022-05-04 PROCEDURE — 99215 OFFICE O/P EST HI 40 MIN: CPT | Performed by: NURSE PRACTITIONER

## 2022-05-04 RX ORDER — CARVEDILOL 6.25 MG/1
6.25 TABLET ORAL NIGHTLY
Refills: 0 | COMMUNITY
Start: 2022-05-04

## 2022-05-04 RX ORDER — SACUBITRIL AND VALSARTAN 24; 26 MG/1; MG/1
1 TABLET, FILM COATED ORAL 2 TIMES DAILY
Refills: 0 | COMMUNITY
Start: 2022-05-04

## 2022-05-04 NOTE — PROGRESS NOTES
Weight today 138.8 lb. Last seen in [facility] 11/30/21 153 lb. She reports her blood pressures have been high. 120/80 76 here in the office. 180/96 at work Friday ([address]). Reports having night sweats the last three nights (she had total hysterectomy 2016). Recently added new migraine med and decrease in entresto dose. 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 New PCP since March and [facility] for neurology. Denies chest pain or pressure but get occasional palpitations at night. TSH has been recently tested. Denies swelling. Has not needed diuretic \"in a long time. \"

## 2022-05-04 NOTE — PATIENT INSTRUCTIONS
Decrease entresto to 24/26 mg twice daily ( half of the 49/51 mg tab)     Continue all your same medications    Call if having any dizziness, lightheadedness, heart racing, palpitations, chest pain, shortness of breath, coughing,  wheezing, swelling, weight gain,  or weakness    Call 911 with severe shortness of breath, chest pain or chest pressure not improved after 15 minutes of rest or if feeling faint,  passing out or having a fall     Weigh yourself daily in the morning before breakfast, call if gaining 3 lbs or more overnight or more than 5 lbs in one week. Follow 2000 mg sodium restricted , heart healthy diet, Keep daily fluids at 48-64 ounces per day    Follow up with Dr. Eduardo Ocampo in early June    Blood test for basic metabolic panel in about 2 weeks when having other blood work with Dr. Jeovanny Ambrocio     Follow up with the specialty care clinic as needed or as directed         Limit sodium to  2000 mg daily. Common high sodium foods include frozen dinners, soups (not homemade), some cereal, vegetable juice, canned vegetables, lunch meats, processed meats like hotdogs, sausage, hayes, pepperoni, soy sauce, pre-packaged rice or potatoes. Please remember to read nutrition labels for sodium content.     www.healthyeating. nhlbi.nih.gov      Exercise daily as tolerated, with goal of doing moderate aerobic exercise like walking for about 30 minutes 5 days per week. Start by walking at a slow to moderate pace for 5-10 minutes 2-3 times a day. Pace your activity to prevent shortness of breath or fatigue.  Stop exercise if you develop chest pain, lightheadedness, or significant shortness of breath

## 2022-05-06 RX ORDER — ZOLPIDEM TARTRATE 5 MG/1
5 TABLET ORAL NIGHTLY PRN
Qty: 30 TABLET | Refills: 2 | Status: SHIPPED | OUTPATIENT
Start: 2022-05-06

## 2022-05-06 NOTE — TELEPHONE ENCOUNTER
LOV: 3/16/2021  Last refill: 3/15/2022    Dr. Wendy Benson review and sign pended prescription if agreeable.

## 2022-05-31 RX ORDER — TOPIRAMATE 25 MG/1
50 TABLET ORAL 2 TIMES DAILY
Qty: 360 TABLET | Refills: 0 | Status: SHIPPED | OUTPATIENT
Start: 2022-05-31

## 2022-06-01 ENCOUNTER — OFFICE VISIT (OUTPATIENT)
Dept: INTERNAL MEDICINE CLINIC | Facility: CLINIC | Age: 47
End: 2022-06-01
Payer: COMMERCIAL

## 2022-06-01 VITALS
DIASTOLIC BLOOD PRESSURE: 68 MMHG | HEART RATE: 54 BPM | WEIGHT: 137.38 LBS | SYSTOLIC BLOOD PRESSURE: 106 MMHG | RESPIRATION RATE: 16 BRPM | HEIGHT: 63 IN | BODY MASS INDEX: 24.34 KG/M2

## 2022-06-01 DIAGNOSIS — I10 ESSENTIAL HYPERTENSION: Primary | ICD-10-CM

## 2022-06-01 PROCEDURE — 3008F BODY MASS INDEX DOCD: CPT | Performed by: INTERNAL MEDICINE

## 2022-06-01 PROCEDURE — 99213 OFFICE O/P EST LOW 20 MIN: CPT | Performed by: INTERNAL MEDICINE

## 2022-06-01 PROCEDURE — 3078F DIAST BP <80 MM HG: CPT | Performed by: INTERNAL MEDICINE

## 2022-06-01 PROCEDURE — 3074F SYST BP LT 130 MM HG: CPT | Performed by: INTERNAL MEDICINE

## 2022-06-01 NOTE — PATIENT INSTRUCTIONS
Your blood pressure today was very good at 106/68. Please continue current medications. Continue regular visits with Cardiology and Neurology. Return visit in 6 months.

## 2022-06-23 ENCOUNTER — PATIENT MESSAGE (OUTPATIENT)
Dept: CARDIOLOGY CLINIC | Facility: HOSPITAL | Age: 47
End: 2022-06-23

## 2022-06-23 NOTE — TELEPHONE ENCOUNTER
[employer] message sent and read. From: Mendez Maier  To: Breanna Clarke NP  Sent: 6/23/2022  9:35 AM CDT  Subject: Feeling Strange    Good Morning Zahida. I am dropping off my cardiac monitor today with Dr. Rubia Brown office. I have been feeling very off the past two weeks. Very shakey and headaches every day. Is there anyway I can get in to see you? I'm sorry for the short notice but I'm feeling a little concerned.     Thank you,    Urmila Kelley

## 2022-06-29 ENCOUNTER — PATIENT MESSAGE (OUTPATIENT)
Dept: CARDIOLOGY CLINIC | Facility: HOSPITAL | Age: 47
End: 2022-06-29

## 2022-06-29 ENCOUNTER — LAB ENCOUNTER (OUTPATIENT)
Dept: LAB | Facility: HOSPITAL | Age: 47
End: 2022-06-29
Attending: INTERNAL MEDICINE
Payer: COMMERCIAL

## 2022-06-29 DIAGNOSIS — I50.20 HEART FAILURE WITH REDUCED EJECTION FRACTION (HCC): ICD-10-CM

## 2022-06-29 DIAGNOSIS — R00.2 PALPITATIONS: ICD-10-CM

## 2022-06-29 DIAGNOSIS — I34.0 NONRHEUMATIC MITRAL VALVE REGURGITATION: ICD-10-CM

## 2022-06-29 DIAGNOSIS — R42 DIZZINESS: Primary | ICD-10-CM

## 2022-06-29 LAB
ALBUMIN SERPL-MCNC: 4.4 G/DL (ref 3.4–5)
ALBUMIN/GLOB SERPL: 1.2 {RATIO} (ref 1–2)
ALP LIVER SERPL-CCNC: 73 U/L
ALT SERPL-CCNC: 45 U/L
ANION GAP SERPL CALC-SCNC: 9 MMOL/L (ref 0–18)
AST SERPL-CCNC: 20 U/L (ref 15–37)
BILIRUB SERPL-MCNC: 0.6 MG/DL (ref 0.1–2)
BUN BLD-MCNC: 10 MG/DL (ref 7–18)
BUN/CREAT SERPL: 11.2 (ref 10–20)
CALCIUM BLD-MCNC: 10 MG/DL (ref 8.5–10.1)
CHLORIDE SERPL-SCNC: 107 MMOL/L (ref 98–112)
CO2 SERPL-SCNC: 23 MMOL/L (ref 21–32)
CREAT BLD-MCNC: 0.89 MG/DL
D DIMER PPP FEU-MCNC: 0.27 UG/ML FEU (ref ?–0.5)
DEPRECATED RDW RBC AUTO: 44.2 FL (ref 35.1–46.3)
ERYTHROCYTE [DISTWIDTH] IN BLOOD BY AUTOMATED COUNT: 13.2 % (ref 11–15)
FASTING STATUS PATIENT QL REPORTED: NO
GLOBULIN PLAS-MCNC: 3.8 G/DL (ref 2.8–4.4)
GLUCOSE BLD-MCNC: 87 MG/DL (ref 70–99)
HCT VFR BLD AUTO: 41.4 %
HGB BLD-MCNC: 13.7 G/DL
MCH RBC QN AUTO: 30 PG (ref 26–34)
MCHC RBC AUTO-ENTMCNC: 33.1 G/DL (ref 31–37)
MCV RBC AUTO: 90.8 FL
OSMOLALITY SERPL CALC.SUM OF ELEC: 286 MOSM/KG (ref 275–295)
PLATELET # BLD AUTO: 330 10(3)UL (ref 150–450)
POTASSIUM SERPL-SCNC: 4 MMOL/L (ref 3.5–5.1)
PROT SERPL-MCNC: 8.2 G/DL (ref 6.4–8.2)
RBC # BLD AUTO: 4.56 X10(6)UL
SODIUM SERPL-SCNC: 139 MMOL/L (ref 136–145)
TROPONIN I HIGH SENSITIVITY: 29 NG/L
TSI SER-ACNC: 1.61 MIU/ML (ref 0.36–3.74)
WBC # BLD AUTO: 8.3 X10(3) UL (ref 4–11)

## 2022-06-29 PROCEDURE — 85027 COMPLETE CBC AUTOMATED: CPT

## 2022-06-29 PROCEDURE — 80053 COMPREHEN METABOLIC PANEL: CPT

## 2022-06-29 PROCEDURE — 36415 COLL VENOUS BLD VENIPUNCTURE: CPT

## 2022-06-29 PROCEDURE — 84443 ASSAY THYROID STIM HORMONE: CPT

## 2022-06-29 PROCEDURE — 84484 ASSAY OF TROPONIN QUANT: CPT

## 2022-06-29 PROCEDURE — 85379 FIBRIN DEGRADATION QUANT: CPT

## 2022-08-16 RX ORDER — ROPINIROLE 0.5 MG/1
TABLET, FILM COATED ORAL
Qty: 90 TABLET | Refills: 3 | OUTPATIENT
Start: 2022-08-16

## 2022-08-16 NOTE — TELEPHONE ENCOUNTER
LOV 3/16/2021    Last refill: 2/22/2022    Medication request not pending at this time due to not being seen in clinic in over a year. Medication request denied.

## 2022-09-07 RX ORDER — SACUBITRIL AND VALSARTAN 49; 51 MG/1; MG/1
TABLET, FILM COATED ORAL
Qty: 180 TABLET | Refills: 3 | OUTPATIENT
Start: 2022-09-07

## 2022-09-15 RX ORDER — VENLAFAXINE 25 MG/1
25 TABLET ORAL NIGHTLY
Qty: 90 TABLET | Refills: 1 | Status: SHIPPED | OUTPATIENT
Start: 2022-09-15

## 2022-09-15 NOTE — TELEPHONE ENCOUNTER
Refill passed per [facility] [facility] protocol.   Requested Prescriptions   Pending Prescriptions Disp Refills    VENLAFAXINE 25 MG Oral Tab [Pharmacy Med Name: VENLAFAXINE HCL TABS 25MG] 90 tablet 3     Sig: TAKE 1 TABLET NIGHTLY        Psychiatric Non-Scheduled (Anti-Anxiety) Passed - 9/14/2022 11:52 PM        Passed - In person appointment or virtual visit in the past 6 mos or appointment in next 3 mos       Recent Outpatient Visits              3 months ago Essential hypertension    [facility], [address], Jose Rafael Yan MD    Office Visit    4 months ago Chronic HFrEF (heart failure with reduced ejection fraction) [facility])    [address], NP    Office Visit    6 months ago Other migraine without status migrainosus, [facility] Amy Le MD    Office Visit    7 months ago Fatigue, unspecified type    [facility], [address], Jose Rafael Yan MD    Office Visit    9 months ago Chronic HFrEF (heart failure with reduced ejection fraction) [facility])    [address], NP    Office Visit                     Recent Outpatient Visits              3 months ago Essential hypertension    [facility], [address], Jose Rafael Yan MD    Office Visit    4 months ago Chronic HFrEF (heart failure with reduced ejection fraction) [facility])    [address], NP    Office Visit    6 months ago Other migraine without status migrainosus, [facility] Amy Le MD    Office Visit    7 months ago Fatigue, unspecified type    [facility], [address], Jose Rafael Yan MD    Office Visit    9 months ago Chronic HFrEF (heart failure with reduced ejection fraction) [facility])    [address], NP    Office Visit

## 2022-09-23 RX ORDER — ZOLPIDEM TARTRATE 5 MG/1
5 TABLET ORAL NIGHTLY PRN
Qty: 30 TABLET | Refills: 2 | OUTPATIENT
Start: 2022-09-23

## 2022-09-30 ENCOUNTER — PATIENT MESSAGE (OUTPATIENT)
Dept: CARDIOLOGY CLINIC | Facility: HOSPITAL | Age: 47
End: 2022-09-30

## 2022-10-03 NOTE — TELEPHONE ENCOUNTER
From: Casper Prajapati  Sent: 9/30/2022 12:27 PM CDT  To: [facility] Clinical Pool  Subject: Form 452    form

## 2023-01-09 RX ORDER — ROPINIROLE 0.5 MG/1
0.5 TABLET, FILM COATED ORAL NIGHTLY
Qty: 90 TABLET | Refills: 0 | Status: CANCELLED | OUTPATIENT
Start: 2023-01-09

## 2023-02-14 RX ORDER — ZOLPIDEM TARTRATE 5 MG/1
5 TABLET ORAL NIGHTLY PRN
Qty: 30 TABLET | Refills: 2 | Status: CANCELLED | OUTPATIENT
Start: 2023-02-14

## 2023-02-15 RX ORDER — ATORVASTATIN CALCIUM 80 MG/1
80 TABLET, FILM COATED ORAL NIGHTLY
Qty: 30 TABLET | Refills: 0 | OUTPATIENT
Start: 2023-02-15

## 2023-03-23 ENCOUNTER — TELEPHONE (OUTPATIENT)
Dept: NEUROLOGY | Facility: CLINIC | Age: 48
End: 2023-03-23

## 2023-03-23 RX ORDER — ATORVASTATIN CALCIUM 80 MG/1
80 TABLET, FILM COATED ORAL NIGHTLY
Qty: 30 TABLET | Refills: 0 | OUTPATIENT
Start: 2023-03-23

## 2023-03-23 NOTE — TELEPHONE ENCOUNTER
Received refill request from [facility] # [id number] for       Medication NDC Prescription # HERMINIA   TOPIRAMATE 25 MG TABLET [id number] No   Written Date Last Fill Date Quantity Days Supply   5/31/2022 12/26/2022 360 tablet 90   Sig Notes   TAKE 2 TABLETS BY MOUTH 2 TIMES DAILY. Not Available       Former [facility] pt. Needs to establish with new provider.     My Chart message sent

## 2024-01-06 RX ORDER — VENLAFAXINE 25 MG/1
25 TABLET ORAL NIGHTLY
Qty: 90 TABLET | Refills: 1 | OUTPATIENT
Start: 2024-01-06

## 2024-04-04 ENCOUNTER — OFFICE VISIT (OUTPATIENT)
Dept: NEUROLOGY | Facility: CLINIC | Age: 49
End: 2024-04-04
Payer: COMMERCIAL

## 2024-04-04 ENCOUNTER — HOSPITAL ENCOUNTER (OUTPATIENT)
Dept: MRI IMAGING | Facility: HOSPITAL | Age: 49
Discharge: HOME | End: 2024-04-04
Attending: Other
Payer: COMMERCIAL

## 2024-04-04 ENCOUNTER — LAB ENCOUNTER (OUTPATIENT)
Dept: LAB | Facility: HOSPITAL | Age: 49
End: 2024-04-04
Attending: Other
Payer: COMMERCIAL

## 2024-04-04 VITALS
WEIGHT: 137 LBS | DIASTOLIC BLOOD PRESSURE: 75 MMHG | BODY MASS INDEX: 24.27 KG/M2 | HEART RATE: 75 BPM | SYSTOLIC BLOOD PRESSURE: 122 MMHG | HEIGHT: 63 IN

## 2024-04-04 DIAGNOSIS — G37.9 DEMYELINATING LESION (HCC): ICD-10-CM

## 2024-04-04 DIAGNOSIS — G37.9 DEMYELINATING LESION (HCC): Primary | ICD-10-CM

## 2024-04-04 DIAGNOSIS — H43.393 VITREOUS FLOATERS OF BOTH EYES: ICD-10-CM

## 2024-04-04 PROBLEM — R20.0 NUMBNESS IN FEET: Status: RESOLVED | Noted: 2024-04-04 | Resolved: 2024-04-04

## 2024-04-04 PROBLEM — G47.01 INSOMNIA DUE TO MEDICAL CONDITION: Status: RESOLVED | Noted: 2017-01-31 | Resolved: 2024-04-04

## 2024-04-04 PROBLEM — F32.A ANXIETY AND DEPRESSION: Status: RESOLVED | Noted: 2021-02-11 | Resolved: 2024-04-04

## 2024-04-04 PROBLEM — G47.61 PERIODIC LIMB MOVEMENTS OF SLEEP: Status: RESOLVED | Noted: 2019-04-15 | Resolved: 2024-04-04

## 2024-04-04 PROBLEM — F41.9 ANXIETY AND DEPRESSION: Status: RESOLVED | Noted: 2021-02-11 | Resolved: 2024-04-04

## 2024-04-04 PROBLEM — R76.8 ANA POSITIVE: Status: RESOLVED | Noted: 2019-04-15 | Resolved: 2024-04-04

## 2024-04-04 PROBLEM — H53.9 VISUAL CHANGES: Status: RESOLVED | Noted: 2019-04-15 | Resolved: 2024-04-04

## 2024-04-04 PROBLEM — I34.0 NONRHEUMATIC MITRAL VALVE REGURGITATION: Status: RESOLVED | Noted: 2020-11-05 | Resolved: 2024-04-04

## 2024-04-04 PROBLEM — S93.401A SPRAIN OF RIGHT ANKLE: Status: RESOLVED | Noted: 2021-02-11 | Resolved: 2024-04-04

## 2024-04-04 LAB
FOLATE SERPL-MCNC: 15.2 NG/ML (ref 5.4–?)
VIT B12 SERPL-MCNC: 521 PG/ML (ref 211–911)

## 2024-04-04 PROCEDURE — A9575 INJ GADOTERATE MEGLUMI 0.1ML: HCPCS | Performed by: OTHER

## 2024-04-04 PROCEDURE — 84425 ASSAY OF VITAMIN B-1: CPT | Performed by: OTHER

## 2024-04-04 PROCEDURE — 82746 ASSAY OF FOLIC ACID SERUM: CPT | Performed by: OTHER

## 2024-04-04 PROCEDURE — 70553 MRI BRAIN STEM W/O & W/DYE: CPT | Performed by: OTHER

## 2024-04-04 PROCEDURE — 72156 MRI NECK SPINE W/O & W/DYE: CPT | Performed by: OTHER

## 2024-04-04 PROCEDURE — 99215 OFFICE O/P EST HI 40 MIN: CPT | Performed by: OTHER

## 2024-04-04 PROCEDURE — 86051 AQUAPORIN-4 ANTB ELISA: CPT | Performed by: OTHER

## 2024-04-04 PROCEDURE — 82607 VITAMIN B-12: CPT | Performed by: OTHER

## 2024-04-04 RX ORDER — MULTIVITAMIN,THER AND MINERALS
1 TABLET ORAL
COMMUNITY

## 2024-04-04 RX ORDER — LOSARTAN POTASSIUM 25 MG/1
25 TABLET ORAL DAILY
COMMUNITY

## 2024-04-04 RX ORDER — GADOTERATE MEGLUMINE 376.9 MG/ML
15 INJECTION INTRAVENOUS
Status: COMPLETED | OUTPATIENT
Start: 2024-04-04 | End: 2024-04-04

## 2024-04-04 RX ADMIN — GADOTERATE MEGLUMINE 12 ML: 376.9 INJECTION INTRAVENOUS at 19:21:00

## 2024-04-04 NOTE — TELEPHONE ENCOUNTER
LOV: 3/16/21  Suggested follow up: 3-4 months  NOV: none  Last refill: 5/6/22 & 2/22/22      MyChart sent to patient, appointment required.

## 2024-04-04 NOTE — PROGRESS NOTES
[facility]  [address], [address]  [facility] [id number] [phone]        Neurology Clinic Follow Up Note    Chief Complaint:  New Patient (NPT - The patient presents stating that she has bilat hand tremors, gait imbalance, and brain fog (pt confirms she has insomnia) - pt tested positive for sleep apnea, but feels I has resolved d/t weight loss. States that she was previously seen by Dr. Rosado for MS wok up and was informed she has a few lesions on her MRI. Since then, she had a heart attack.)    Referring:  Daniel Francis MD  [address]  [address]  [place] [address]    HPI:   Gela Dinh is a 49 year old woman w/ a pmhx of  +LEXY, hx of AML, headaches, migraine w/o aura,  prior hx of smoking,  MI,  heart failure, restless leg syndrome, HLD, RUBÉN (tx w/ weight loss), sinus bradycardia, subclinical hyperthyroidism, fibroids requiring total abdominal hysterectomy, kidney stones prior to being on topamax,  anxiety and depression who was referred by her cardiologist, Dr. Francis for expedited evaluation of demyelinating disease.    Regarding her prior evaluations w/ neurology:    2017 - chronic HA w/ migrainous features and tearing.  Paraesthesias in her fingertips, worse in the AM.  Sometimes involved entire arm.  Intermittent blurred vision/oily film over her eyes. R foot numbness. On exam her left toe was up going. Mri brain w/ pontine lesion.     Also c/o dizziness/imbalance, and sense  of \"cloudiness.\"  Also reports she \"likes to sleep,\" and is \"constantly moving in her sleep.\"        +constipation. BM once every 3 days. Lifelong.    Optic neuritis symptoms  Painless vision loss  no  pain with eom no  Photopsias no  monocular vision loss no  Change in color vision no       Spinal cord involvement    A complete (rather than partial) spinal cord syndrome, especially with paroxysmal tonic spasms: no  (By contrast, myelitis in MS tends to be incomplete and  asymmetric).  symmetric paraparesis or quadriparesis no  bladder dysfunction no  sensory loss below the level of the spinal cord lesion (if any) no  Accompanying symptoms may include:   paroxysmal tonic spasms of the trunk or extremities no   radicular pain yes x 3 weeks  on the left.  L'hermitte sign yes   \"a little bit'  Pruritus  no    FYI:worse outcomes are predicted by more extensive longitudinally extensive transverse myelitis lesions with subsequent cord atrophy    Area postrema syndrome  An area postrema clinical syndrome consisting of intractable hiccups or nausea and vomiting  Hiccups no  intractable hiccups no  nausea and vomiting no    FYI:  58% of patients with neuromyelitis optic spectrum disorders (NMOSD) experience area postrema syndrome during the course of their disease, and this syndrome often heralds other NMOSD-related relapses over the disease course    Brainstem syndromes:  Variable presentations of cranial nerve palsies, including:   hearing loss  no  gaze palsies  no   Opsoclonus   no  Myoclonus  no   Ataxia   chronic  limb weakness  no  Sialorrhea no  Dysarthria no  Dysphagia no  sensory sx involving the face no  changes in their voice  no    Diencephalic symptoms  symptomatic narcolepsy no  excessive daytime sleepiness yes  Obesity no  various autonomic manifestations such as:  Hypotension  ?  Bradycardia yes  Hypothermia no  Anorexia  no  Hyponatremia - Hyponatremia secondary to the syndrome of inappropriate antidiuretic hormone secretion or to cerebral salt-wasting syndrome has been associated with seropositive AQP4 antibody NMOS: no    Cerebral syndromes:  Encephalopathy (acute disseminated encephalomyelitis [ADEM]-like):  no   posterior reversible encephalopathy syndrome (PRES):  no  Hydrocephalus  no  Seizures  no    Other symptoms  Myeloradiculopathy  Clinical features of acute inflammatory demyelinating polyradiculoneuropathy (AIDP) including:  weakness and sensory loss with lower  motor neuron features on examination:  no  MRI shows evidence of cauda equina and spinal root enhancement:  no     Neuropsychiatric  recurrent major depressive disorder  no   patients with NMOSD may also have:  Anjali  no  dysthymic disorder  no  anxiety   no  psychosis   no  Eating disorders  no    Uveitis  Vision loss  no  blurred vision  yes  Floaters  yes  photosensitivity due to inflammation of the uveal tract :   ?    FYI: may occur in 1% of NMOSD cases, with bilateral anterior uveitis being the most common subtype; uveitis attacks may precede relapses among 67% of patient.    recurrent myalgias and evidence of an autoimmune myopathy with targeting of sarcolemmal AQP4 in skeletal muscle by complement-activating immunoglobulin G no    several reports of transiently elevated serum creatine kinase (ie, \"hyper-CKemia\") associated with attacks of NMOSD: no    Uhthoff phenomenon: no    Red flags for misdiagnosis of neuromyelitis optica  Acute symptom onset (less than 4 hours to kristyn)  no  Chronic progressive course (worsening over 4 or more weeks)  yes  Active coexisting disorder that could mimic NMOSD clinical presentation  no  Cerebral MRI lesions showing persistent gadolinium enhancement for more than 3 months  no  Presence of CSF oligoclonal bands  no       ROS: Pertinent positive and negatives per HPI.  All others were reviewed and negative.     Medications:  Current Outpatient Medications   Medication Instructions    Albuterol Sulfate  (90 Base) MCG/ACT Inhalation Aero Soln Inhalation, Every 6 hours PRN    aspirin 81 mg, Oral, Daily    atorvastatin (LIPITOR) 80 mg, Oral, Nightly    carvedilol (COREG) 6.25 mg, Oral, Nightly    multivitamin Oral Tab 1 tablet, Oral, Daily    ROPINIROLE HCL 0.5 MG Oral Tab TAKE 1 TABLET BY MOUTH NIGHTLY    sacubitril-valsartan (ENTRESTO) 24-26 MG Oral Tab 1 tablet, Oral, 2 times daily    topiramate (TOPAMAX) 50 mg, Oral, 2 times daily    venlafaxine (EFFEXOR) 25 mg, Oral,  Nightly    zolpidem (AMBIEN) 5 mg, Oral, Nightly PRN        Reviewed and assessed      Objective:  Last vitals and weight :  There is no height or weight on file to calculate BMI.   There were no vitals filed for this visit.   not currently breastfeeding.  There were no vitals taken for this visit.  Exam:  - General: appears stated age and no distress     - Pulmonary: Normal excursion of the chest.  No signs of respiratory distress.  Neurologic Exam  - Mental Status: Alert and attentive. .  Speech is spontaneous, fluent, and prosodic. Comprehension and repetition intact. Phrase length and rate are normal. No paraphasic errors, neologisms, anomia, acalculia, apraxia, anosognosia, or R/L confusion.   - Cranial Nerves: No gaze preference. Visual fields:normal  Pupils are 4mm briskly constricting to 3mm and equally round and reactive to light  in a well lit room. No rAPD. EOMI. No nystagmus. No ptosis. V1-V3 intact B/L to light touch.No pathological facial asymmetry. No flattening of the nasolabial fold. .  Hearing grossly intact.  Tongue midline. No atrophy or fasiculations of the tongue noted. Palate and uvula elevate symmetrically.  Shoulder shrug symmetric.     - Motor:  normal tone/bulk. No interosseous wasting. No flattening of hypothenar eminences.   Motor Strength    Pronator drift: No pronator drift   Arm Rolling: No orbiting.   Finger Taps: Finger taps are symmetric in rate and amplitude.    Rapid movements: symmetric. No fatiguing.   Right Left     Motor Strength   Deltoids 5 5  Triceps 5 5  Biceps 5 5   5 5   Hip Flexors 5 5   Knee extensors 5 5  Knee flexors 5 5      Foot Taps:    Asterixis: No asterixis noted.   Tremor:       Reflexes:    C5 C6 C7  L4 S1   R 2+ 2+  2+ 2+   L 2+ 2+  2+ 2+    Frontal release signs:Not assessed.    Jaw Jerk:    Danuta's sign:absent   Nonsustained clonus: Absent   Sustained clonus: Absent   - Sensory:   Light touch: normal  Temperature:normal  Pinprick:   Vibration:  normal  Proprioception:      Sensory level:      Romberg:   - Cerebellum: No truncal ataxia. No titubations. No dysmetria, no dysdiadochokinesis. No rebound.   - Gait/station: Normal gait and station. Symmetric arm swing.   - Toe walking: normal  - Heel walking: normal  - Plantar response: flexor bilaterally    Exam is unchanged on 04/04/24    Most recent lab results:   Reviewed and assessed  No results found for this or any previous visit (from the past 36 hour(s)).    Diagnostic studies:  Performed an independent visualization of  mri brain wwo from 2017, 2022  Imaging revealed:   agree w/ read            Assessment      Her lesion has been stable for 5 yrs  (2017 to 2022). She   MAY HAVE had interval attacks of new symptoms.  She has new left sided neck pain. Will repeat mri brain wwo and mri cspine wwo.  She only had  2 oligoclonal band in the csf, which  is not a positive result.   Lesions in the periaquaductal gray can be d/t wernicke's/thiamine deficiency. Will check levels, but highly doubt.       Unrelated, she is on topmax and a has a hx of kidney stones. Topamax can cause kidney stones and she needs to be cautious.      Plan   1. Demyelinating lesion (HCC)  - MRI SPINE CERVICAL (W+WO) (CPT=72156); Future  - MRI BRAIN (W+WO) (CPT=70553); Future  - Vitamin B1 (Thiamine), Blood [E]  - Vitamin B12 [E]  - Folic Acid Serum [E]  - Aquaporin-4 Receptor Antibody; Future    2. Vitreous floaters of both eyes  - Specialty Other Referral - External                This document is not intended to support charting by exception.  Sections left blank in a completed note should be presumed not to have been done.      Disclaimer:   This record was dictated using  Dragon software. There may be errors due to voice recognition problems that were not realized and corrected during the completion of the note.      I spent 51 minutes counseling the patient regarding the findings of investigations, treatment options, risks and  benefits of treatment, and management of care regarding the diagnosis above.  The total face-to-face physician visit time was 51 minutes.  Over 50% of the physician time of the visit was spent in counseling/coordination of care on the above.      Thank you for allowing me to participate in the care of your patient.    Lino Mcallister DO  4/4/2024

## 2024-04-05 NOTE — TELEPHONE ENCOUNTER
Need appt   Rx atorvastain per Dr should be refilled by cardiologist   Please review.Protocol failed/ No protocol      Requested Prescriptions   Pending Prescriptions Disp Refills    sacubitril-valsartan (ENTRESTO) 24-26 MG Oral Tab  0     Sig: Take 1 tablet by mouth 2 (two) times daily.       There is no refill protocol information for this order       atorvastatin 80 MG Oral Tab 30 tablet 0     Sig: Take 1 tablet (80 mg total) by mouth nightly.       Cholesterol Medication Protocol Failed - 4/4/2024 12:19 PM        Failed - ALT < 80     Lab Results   Component Value Date    ALT 45 06/29/2022             Failed - ALT resulted within past year        Failed - Lipid panel within past 12 months     Lab Results   Component Value Date    CHOLEST 234 (H) 10/08/2020    TRIG 270 (H) 10/08/2020    HDL 50 10/08/2020     (H) 10/08/2020    VLDL 54 (H) 10/08/2020    NONHDLC 184 (H) 10/08/2020             Failed - In person appointment or virtual visit in the past 12 mos or appointment in next 3 mos     Recent Outpatient Visits              Yesterday Demyelinating lesion ([facility])    [facility] Lino Mcallister DO    Office Visit    1 year ago Essential hypertension    [facility]    Office Visit    1 year ago Chronic HFrEF (heart failure with reduced ejection fraction) ([facility])    [facility] Zahida Shi NP    Office Visit    2 years ago Other migraine without status migrainosus, intractable    [facility]    Office Visit    2 years ago Fatigue, unspecified type    [facility]    [facility] Visit                        carvedilol 6.25 MG Oral Tab 30 tablet 0     Sig: Take 1 tablet (6.25 mg total) by mouth at bedtime.       Hypertension Medications Protocol Failed - 4/4/2024 12:19 PM         Failed - CMP or BMP in past 12 months        Failed - In person appointment or virtual visit in the past 12 mos or appointment in next 3 mos     Recent Outpatient Visits              Yesterday Demyelinating [facility]    [facility] Visit    1 year ago Essential hypertension    [facility]    [facility] Visit    1 year ago Chronic HFrEF (heart failure with reduced ejection fraction) ([facility])    [facility] Zahida Shi, EARL    Office Visit    2 years ago Other migraine without status migrainosus, intractable    [facility], Patrick Paul MD    Office Visit    2 years ago Fatigue, unspecified type    [facility]    [facility]                      Failed - EGFRCR or GFRNAA > 50     GFR Evaluation            Passed - Last BP reading less than 140/90     BP Readings from Last 1 Encounters:   04/04/24 122/75                         Recent Outpatient Visits              Yesterday Demyelinating lesion ([facility])    [facility]    Office Visit    1 year ago Essential hypertension    [facility]    Office Visit    1 year ago Chronic HFrEF (heart failure with reduced ejection fraction) ([facility])    [facility] Zahida Shi, NP    Office Visit    2 years ago Other migraine without status migrainosus, [facility]    [facility] Visit    2 years ago Fatigue, unspecified type    [facility]    [facility] Visit

## 2024-04-08 LAB
NMO IGG AUTOABS: <1.5 U/ML
VITAMIN B1 WHOLE BLD: 90 NMOL/L

## 2024-04-09 RX ORDER — ZOLPIDEM TARTRATE 5 MG/1
5 TABLET ORAL NIGHTLY PRN
Qty: 30 TABLET | Refills: 2 | OUTPATIENT
Start: 2024-04-09

## 2024-04-09 RX ORDER — SACUBITRIL AND VALSARTAN 24; 26 MG/1; MG/1
1 TABLET, FILM COATED ORAL 2 TIMES DAILY
Qty: 60 TABLET | Refills: 0 | OUTPATIENT
Start: 2024-04-09

## 2024-04-09 RX ORDER — ATORVASTATIN CALCIUM 80 MG/1
80 TABLET, FILM COATED ORAL NIGHTLY
Qty: 30 TABLET | Refills: 0 | OUTPATIENT
Start: 2024-04-09

## 2024-04-09 RX ORDER — ROPINIROLE 0.5 MG/1
0.5 TABLET, FILM COATED ORAL NIGHTLY
Qty: 90 TABLET | Refills: 0 | OUTPATIENT
Start: 2024-04-09

## 2024-04-09 RX ORDER — CARVEDILOL 6.25 MG/1
6.25 TABLET ORAL NIGHTLY
Qty: 30 TABLET | Refills: 0 | OUTPATIENT
Start: 2024-04-09

## 2024-05-31 ENCOUNTER — PATIENT MESSAGE (OUTPATIENT)
Dept: NEUROLOGY | Facility: CLINIC | Age: 49
End: 2024-05-31

## 2024-05-31 ENCOUNTER — OFFICE VISIT (OUTPATIENT)
Dept: INTERNAL MEDICINE CLINIC | Facility: CLINIC | Age: 49
End: 2024-05-31

## 2024-05-31 VITALS
HEIGHT: 63 IN | SYSTOLIC BLOOD PRESSURE: 124 MMHG | DIASTOLIC BLOOD PRESSURE: 72 MMHG | HEART RATE: 63 BPM | WEIGHT: 130 LBS | OXYGEN SATURATION: 99 % | BODY MASS INDEX: 23.04 KG/M2

## 2024-05-31 DIAGNOSIS — Z00.00 ANNUAL PHYSICAL EXAM: Primary | ICD-10-CM

## 2024-05-31 PROCEDURE — 90677 PCV20 VACCINE IM: CPT | Performed by: INTERNAL MEDICINE

## 2024-05-31 PROCEDURE — 3008F BODY MASS INDEX DOCD: CPT | Performed by: INTERNAL MEDICINE

## 2024-05-31 PROCEDURE — 90471 IMMUNIZATION ADMIN: CPT | Performed by: INTERNAL MEDICINE

## 2024-05-31 PROCEDURE — G0438 PPPS, INITIAL VISIT: HCPCS | Performed by: INTERNAL MEDICINE

## 2024-05-31 PROCEDURE — 3078F DIAST BP <80 MM HG: CPT | Performed by: INTERNAL MEDICINE

## 2024-05-31 PROCEDURE — 99396 PREV VISIT EST AGE 40-64: CPT | Performed by: INTERNAL MEDICINE

## 2024-05-31 PROCEDURE — 3074F SYST BP LT 130 MM HG: CPT | Performed by: INTERNAL MEDICINE

## 2024-05-31 RX ORDER — TOPIRAMATE 25 MG/1
50 TABLET ORAL 2 TIMES DAILY
Qty: 360 TABLET | Refills: 0 | Status: CANCELLED | OUTPATIENT
Start: 2024-05-31

## 2024-05-31 RX ORDER — ZOLPIDEM TARTRATE 5 MG/1
5 TABLET ORAL NIGHTLY PRN
Qty: 30 TABLET | Refills: 2 | Status: CANCELLED | OUTPATIENT
Start: 2024-05-31

## 2024-05-31 RX ORDER — VENLAFAXINE 75 MG/1
75 TABLET ORAL DAILY
COMMUNITY

## 2024-05-31 RX ORDER — ALBUTEROL SULFATE 90 UG/1
2 AEROSOL, METERED RESPIRATORY (INHALATION) EVERY 6 HOURS PRN
Qty: 1 EACH | Refills: 2 | Status: SHIPPED | OUTPATIENT
Start: 2024-05-31

## 2024-05-31 NOTE — PATIENT INSTRUCTIONS
Your blood pressure today was excellent at 124/72  You received a Prevnar 20 vaccine today  Please come in soon when you can for blood tests, and schedule a mammogram  Please contact GI to arrange colonoscopy  Annual physical

## 2024-05-31 NOTE — H&P
Gela Dinh is a 49 year old female who presents this afternoon, accompanied by her , for a complete physical exam.  HPI:   Her last visit here was June 2022.  She was without medical insurance until recently.  She does follow regularly with Cardiology and Neurology.    Past medical and past surgical histories reviewed, as outlined below.  Medications reviewed, as listed.  Allergic to morphine.    Diet healthy.  No regular exercise.  Weight down about 5-10 pounds since visit in 2022.  Previously diagnosed with obstructive sleep apnea but is not using CPAP.  Recommend follow-up with Dr. Garcia of Pulmonary.  No recent mammogram.  No prior colorectal cancer screening.  Tdap vaccine May 2016.    Wt Readings from Last 4 Encounters:   05/31/24 130 lb (59 kg)   04/04/24 137 lb (62.1 kg)   06/01/22 137 lb 6.4 oz (62.3 kg)   05/04/22 138 lb 12.8 oz (63 kg)     Body mass index is 23.03 kg/m².       Current Outpatient Medications   Medication Sig Dispense Refill    albuterol 108 (90 Base) MCG/ACT Inhalation Aero Soln Inhale 2 puffs into the lungs every 6 (six) hours as needed for Wheezing. 1 each 2    sacubitril-valsartan 49-51 MG Oral Tab Take 1 tablet by mouth 2 (two) times daily.      venlafaxine 75 MG Oral Tab Take 1 tablet (75 mg total) by mouth daily.      Cyanocobalamin 1000 MCG Sublingual SL Tab Place 1,000 mcg under the tongue once a week.      atorvastatin 80 MG Oral Tab Take 1 tablet (80 mg total) by mouth nightly. 30 tablet 0    carvedilol 6.25 MG Oral Tab Take 1 tablet (6.25 mg total) by mouth at bedtime. 30 tablet 0    topiramate 25 MG Oral Tab Take 2 tablets (50 mg total) by mouth 2 (two) times daily. 360 tablet 0    zolpidem 5 MG Oral Tab Take 1 tablet (5 mg total) by mouth nightly as needed for Sleep. 30 tablet 2    ROPINIROLE HCL 0.5 MG Oral Tab TAKE 1 TABLET BY MOUTH NIGHTLY 90 tablet 0    aspirin 81 MG Oral Chew Tab Chew 1 tablet (81 mg total) by mouth daily. 30 tablet 2    multivitamin Oral  Tab Take 1 tablet by mouth daily.       Allergies   Allergen Reactions    Morphine REACTIVE AIRWAY DISEASE      Past Medical History:    AML (acute myelogenous leukemia) (HCC)    Treated with remission    LEXY positive    Anxiety and depression    Asthma (HCC)    Essential hypertension    Hypercholesterolemia    Insomnia due to medical condition    Migraine without aura    Numbness in feet    Obstructive sleep apnea    On nightly CPAP    Periodic limb movements of sleep    Seasonal allergic rhinitis due to pollen    Subclinical hyperthyroidism    Systolic CHF (HCC)    Echo 10-20 with EF 30-35%; CTA coronary arteries 10-20 normal; Echo 8-21 with EF 47%; Echo 6-22 with EF 55%; Echo 4-24 with EF 45%, mild AI, moderate TR      Past Surgical History:   Procedure Laterality Date    Total abdom hysterectomy  2016    With BSO    Umbilical hernia repair  2005    x2      Family History   Problem Relation Age of Onset    Hypertension Mother     Hypertension Father     Other (CHF) Father     Pancreatic Cancer Maternal Grandfather     Other (Bladder Cancer) Maternal Grandfather       Social History:   Social History     Socioeconomic History    Marital status:    Tobacco Use    Smoking status: Former     Current packs/day: 0.00     Types: Cigarettes     Start date:      Quit date:      Years since quittin.4    Smokeless tobacco: Never    Tobacco comments:     Occasional   Substance and Sexual Activity    Alcohol use: Yes     Comment: Occasional    Drug use: No   Other Topics Concern    Caffeine Concern Yes     Comment: coffee    Exercise Yes     Comment: daily walks     Social Determinants of Health     Financial Resource Strain: Low Risk  (2020)    [facility]    Overall Financial Resource Strain (CARDIA)     Difficulty of Paying Living Expenses: Not very hard   Food Insecurity: No Food Insecurity (2020)    [facility]    Hunger Vital Sign     Worried About Running Out of Food in the Last Year: Never true     Ran Out of Food in the Last Year: Never true   Transportation Needs: Unknown (4/22/2020)    Received from [facility]    PRAPARE - Transportation     Lack of Transportation (Medical): Patient declined     Lack of Transportation (Non-Medical): No   Physical Activity: Insufficiently Active (4/22/2020)    [facility]    Exercise Vital Sign     Days of Exercise per Week: 1 day     Minutes of Exercise per Session: 30 min   Stress: Stress Concern Present (4/22/2020)    Received from [facility]    [employer] - Occupational Stress Questionnaire     Feeling of Stress : To some extent    [facility]    Social Connections    [facility]    Housing Stability          REVIEW OF SYSTEMS:   GENERAL: Frequent fatigue.  No fever  LUNGS: Persistent exertional shortness of breath walking longer distances or upstairs.  No cough or wheezing  CARDIAC: Frequent lightheadedness.  Frequent palpitations (recent heart monitor normal).  No chest pain  GI: Occasional nausea.  Occasional constipation.  No anorexia heartburn dysphagia vomiting abdominal pain diarrhea or rectal bleeding  : No urinary frequency dysuria or hematuria  MUSCULOSKELETAL: No leg swelling  NEURO: Frequent migraine headaches    EXAM:   GENERAL: Pleasant female appearing well in no distress  /72   Pulse 63   Ht 5' 3\" (1.6 m)   Wt 130 lb (59 kg)   SpO2 99%   BMI 23.03 kg/m²   HEENT: Anicteric, conjunctiva pink, oropharynx normal  NECK: Supple without mass or  thyromegaly  NODES: No peripheral adenopathy  LUNGS: Resonant to percussion and clear to auscultation without crackles or wheezes  CARDIAC: Rhythm regular S1 S2 normal without murmur or edema  ABDOMEN: Bowel sounds normal soft nontender without mass or hepatosplenomegaly  EXTREMITIES: Normal without cyanosis or clubbing  PULSES: 2+ bilateral dorsalis pedis and posterior tibial  NEURO: Reflexes 2+ bilaterally and symmetric        ASSESSMENT AND PLAN:   Gela Dinh is a 49 year old female who presents for a complete physical exam.     1. Annual physical exam  Prevnar 20 vaccine today  Check CMP CBC lipid profile and TSH with reflex T4 when able.  Order sent  Schedule screening mammogram.  Order sent.  Discussed and recommended colorectal cancer screening.  Referral for GI given to arrange colonoscopy  Continue current medications.  Prescription refill for albuterol sent to pharmacy  Ongoing subspecialty follow-up  Annual physical  - albuterol 108 (90 Base) MCG/ACT Inhalation Aero Soln; Inhale 2 puffs into the lungs every 6 (six) hours as needed for Wheezing.  Dispense: 1 each; Refill: 2  - EVALUATE & TREAT, GASTRO (INTERNAL)  - [facility] RUPA 2D+3D SCREENING BILAT (CPT=77067/33573); Future  - Comp Metabolic Panel (14); Future  - CBC, Platelet; No Differential; Future  - Lipid Panel; Future  - TSH W Reflex To Free T4; Future      Charlie Kaplan MD  5/31/2024  1:24 PM

## 2024-06-01 ENCOUNTER — LAB ENCOUNTER (OUTPATIENT)
Dept: LAB | Facility: HOSPITAL | Age: 49
End: 2024-06-01
Attending: INTERNAL MEDICINE
Payer: COMMERCIAL

## 2024-06-01 DIAGNOSIS — Z00.00 ANNUAL PHYSICAL EXAM: ICD-10-CM

## 2024-06-01 LAB
ALBUMIN SERPL-MCNC: 4.7 G/DL (ref 3.2–4.8)
ALBUMIN/GLOB SERPL: 1.6 {RATIO} (ref 1–2)
ALP LIVER SERPL-CCNC: 72 U/L
ALT SERPL-CCNC: 18 U/L
ANION GAP SERPL CALC-SCNC: 6 MMOL/L (ref 0–18)
AST SERPL-CCNC: 17 U/L (ref ?–34)
BILIRUB SERPL-MCNC: 0.5 MG/DL (ref 0.3–1.2)
BUN BLD-MCNC: 17 MG/DL (ref 9–23)
BUN/CREAT SERPL: 20.7 (ref 10–20)
CALCIUM BLD-MCNC: 9.8 MG/DL (ref 8.7–10.4)
CHLORIDE SERPL-SCNC: 110 MMOL/L (ref 98–112)
CHOLEST SERPL-MCNC: 287 MG/DL (ref ?–200)
CO2 SERPL-SCNC: 24 MMOL/L (ref 21–32)
CREAT BLD-MCNC: 0.82 MG/DL
DEPRECATED RDW RBC AUTO: 41.3 FL (ref 35.1–46.3)
EGFRCR SERPLBLD CKD-EPI 2021: 87 ML/MIN/1.73M2 (ref 60–?)
ERYTHROCYTE [DISTWIDTH] IN BLOOD BY AUTOMATED COUNT: 12.6 % (ref 11–15)
FASTING PATIENT LIPID ANSWER: YES
FASTING STATUS PATIENT QL REPORTED: YES
GLOBULIN PLAS-MCNC: 3 G/DL (ref 2–3.5)
GLUCOSE BLD-MCNC: 100 MG/DL (ref 70–99)
HCT VFR BLD AUTO: 39.3 %
HDLC SERPL-MCNC: 79 MG/DL (ref 40–59)
HGB BLD-MCNC: 13.6 G/DL
LDLC SERPL CALC-MCNC: 190 MG/DL (ref ?–100)
MCH RBC QN AUTO: 31.4 PG (ref 26–34)
MCHC RBC AUTO-ENTMCNC: 34.6 G/DL (ref 31–37)
MCV RBC AUTO: 90.8 FL
NONHDLC SERPL-MCNC: 208 MG/DL (ref ?–130)
OSMOLALITY SERPL CALC.SUM OF ELEC: 292 MOSM/KG (ref 275–295)
PLATELET # BLD AUTO: 258 10(3)UL (ref 150–450)
POTASSIUM SERPL-SCNC: 4.5 MMOL/L (ref 3.5–5.1)
PROT SERPL-MCNC: 7.7 G/DL (ref 5.7–8.2)
RBC # BLD AUTO: 4.33 X10(6)UL
SODIUM SERPL-SCNC: 140 MMOL/L (ref 136–145)
TRIGL SERPL-MCNC: 105 MG/DL (ref 30–149)
TSI SER-ACNC: 1.09 MIU/ML (ref 0.55–4.78)
VLDLC SERPL CALC-MCNC: 22 MG/DL (ref 0–30)
WBC # BLD AUTO: 6.7 X10(3) UL (ref 4–11)

## 2024-06-01 PROCEDURE — 85027 COMPLETE CBC AUTOMATED: CPT

## 2024-06-01 PROCEDURE — 80053 COMPREHEN METABOLIC PANEL: CPT

## 2024-06-01 PROCEDURE — 36415 COLL VENOUS BLD VENIPUNCTURE: CPT

## 2024-06-01 PROCEDURE — 80061 LIPID PANEL: CPT

## 2024-06-01 PROCEDURE — 84443 ASSAY THYROID STIM HORMONE: CPT

## 2024-06-03 RX ORDER — TOPIRAMATE 25 MG/1
50 TABLET ORAL 2 TIMES DAILY
Qty: 360 TABLET | Refills: 0 | Status: SHIPPED | OUTPATIENT
Start: 2024-06-03

## 2024-06-03 NOTE — TELEPHONE ENCOUNTER
From: Gela Dinh  To: Lino Mcallister  Sent: 5/31/2024 3:35 PM CDT  Subject: Prescription refills     I have a follow up appointment with you in July but I still need a refill on my Topiramate . Can i get a script sent to get me to the appointment? Thank you.

## 2024-06-03 NOTE — TELEPHONE ENCOUNTER
Requested Prescriptions     Pending Prescriptions Disp Refills    topiramate 25 MG Oral Tab 360 tablet 0     Sig: Take 2 tablets (50 mg total) by mouth 2 (two) times daily.        LOV:4/4/24  NOV:7/12/24    Last [place]: 5/31/22 qty 360

## 2024-06-10 RX ORDER — TOPIRAMATE 25 MG/1
50 TABLET ORAL 2 TIMES DAILY
Qty: 360 TABLET | Refills: 0 | OUTPATIENT
Start: 2024-06-10

## 2024-08-06 ENCOUNTER — OFFICE VISIT (OUTPATIENT)
Dept: NEUROLOGY | Facility: CLINIC | Age: 49
End: 2024-08-06
Payer: COMMERCIAL

## 2024-08-06 DIAGNOSIS — G43.711 CHRONIC MIGRAINE WITHOUT AURA, INTRACTABLE, WITH STATUS MIGRAINOSUS: Primary | ICD-10-CM

## 2024-08-06 DIAGNOSIS — I25.2 HISTORY OF MYOCARDIAL INFARCT AT AGE LESS THAN 60 YEARS: ICD-10-CM

## 2024-08-06 PROBLEM — G47.33 OBSTRUCTIVE SLEEP APNEA SYNDROME: Status: ACTIVE | Noted: 2024-05-30

## 2024-08-06 PROCEDURE — 99214 OFFICE O/P EST MOD 30 MIN: CPT | Performed by: OTHER

## 2024-08-06 RX ORDER — GALCANEZUMAB 120 MG/ML
INJECTION, SOLUTION SUBCUTANEOUS
Qty: 2 EACH | Refills: 0 | Status: SHIPPED | OUTPATIENT
Start: 2024-08-06 | End: 2024-09-03

## 2024-08-06 RX ORDER — RIMEGEPANT SULFATE 75 MG/75MG
75 TABLET, ORALLY DISINTEGRATING ORAL AS NEEDED
Qty: 8 TABLET | Refills: 11 | Status: SHIPPED | OUTPATIENT
Start: 2024-08-06 | End: 2025-08-06

## 2024-08-06 RX ORDER — GALCANEZUMAB 120 MG/ML
120 INJECTION, SOLUTION SUBCUTANEOUS
Qty: 1 EACH | Refills: 11 | Status: SHIPPED | OUTPATIENT
Start: 2024-08-06 | End: 2025-08-06

## 2024-08-06 NOTE — PROGRESS NOTES
[facility]  [address], [address]  [facility] [id number] [phone]        Neurology Clinic Follow Up Note    Chief Complaint:  Neurologic Problem (LOV 4/04/2024 The patient presents here today following up with results of MRI Spine, Brain also lab results and Demyelinating lesion. )    Referring:  Daniel Francis MD  [address]  [address]  [place] [address]    HPI:   Gela Dinh is a 49 year old woman w/ a pmhx of  +LEXY, hx of AML, headaches, migraine w/o aura,  prior hx of smoking,  MI,  heart failure, restless leg syndrome, HLD, RUBÉN (tx w/ weight loss), sinus bradycardia, subclinical hyperthyroidism, fibroids requiring total abdominal hysterectomy, kidney stones prior to being on topamax,  anxiety and depression who was referred by her cardiologist, Dr. Francis for expedited evaluation of demyelinating disease.    Regarding her prior evaluations w/ neurology:    2017 - chronic HA w/ migrainous features and tearing.  Paraesthesias in her fingertips, worse in the AM.  Sometimes involved entire arm.  Intermittent blurred vision/oily film over her eyes. R foot numbness. On exam her left toe was up going. Mri brain w/ pontine lesion.     Also c/o dizziness/imbalance, and sense  of \"cloudiness.\"  Also reports she \"likes to sleep,\" and is \"constantly moving in her sleep.\"        +constipation. BM once every 3 days. Lifelong.    Optic neuritis symptoms  Painless vision loss  no  pain with eom no  Photopsias no  monocular vision loss no  Change in color vision no       Spinal cord involvement    A complete (rather than partial) spinal cord syndrome, especially with paroxysmal tonic spasms: no  (By contrast, myelitis in MS tends to be incomplete and asymmetric).  symmetric paraparesis or quadriparesis no  bladder dysfunction no  sensory loss below the level of the spinal cord lesion (if any) no  Accompanying symptoms may include:   paroxysmal tonic spasms of the trunk or extremities  no   radicular pain yes x 3 weeks  on the left.  L'hermitte sign yes   \"a little bit'  Pruritus  no       Area postrema syndrome  An area postrema clinical syndrome consisting of intractable hiccups or nausea and vomiting  Hiccups no  intractable hiccups no  nausea and vomiting no       Brainstem syndromes:  Variable presentations of cranial nerve palsies, including:   hearing loss  no  gaze palsies  no   Opsoclonus   no  Myoclonus  no   Ataxia   chronic  limb weakness  no  Sialorrhea no  Dysarthria no  Dysphagia no  sensory sx involving the face no  changes in their voice  no    Diencephalic symptoms  symptomatic narcolepsy no  excessive daytime sleepiness yes  Obesity no  various autonomic manifestations such as:  Hypotension  ?  Bradycardia yes  Hypothermia no  Anorexia  no  Hyponatremia - Hyponatremia secondary to the syndrome of inappropriate antidiuretic hormone secretion or to cerebral salt-wasting syndrome has been associated with seropositive AQP4 antibody NMOS: no    Cerebral syndromes:  Encephalopathy (acute disseminated encephalomyelitis [ADEM]-like):  no   posterior reversible encephalopathy syndrome (PRES):  no  Hydrocephalus  no  Seizures  no    Other symptoms  Myeloradiculopathy  Clinical features of acute inflammatory demyelinating polyradiculoneuropathy (AIDP) including:  weakness and sensory loss with lower motor neuron features on examination:  no  MRI shows evidence of cauda equina and spinal root enhancement:  no     Neuropsychiatric  recurrent major depressive disorder  no   patients with NMOSD may also have:  Anjali  no  dysthymic disorder  no  anxiety   no  psychosis   no  Eating disorders  no    Uveitis  Vision loss  no  blurred vision  yes  Floaters  yes  photosensitivity due to inflammation of the uveal tract :   ?    FYI: may occur in 1% of NMOSD cases, with bilateral anterior uveitis being the most common subtype; uveitis attacks may precede relapses among 67% of patient.    recurrent  myalgias and evidence of an autoimmune myopathy with targeting of sarcolemmal AQP4 in skeletal muscle by complement-activating immunoglobulin G no    several reports of transiently elevated serum creatine kinase (ie, \"hyper-CKemia\") associated with attacks of NMOSD: no    Uhthoff phenomenon: no    Red flags for misdiagnosis of neuromyelitis optica  Acute symptom onset (less than 4 hours to kristyn)  no  Chronic progressive course (worsening over 4 or more weeks)  yes  Active coexisting disorder that could mimic NMOSD clinical presentation  no  Cerebral MRI lesions showing persistent gadolinium enhancement for more than 3 months  no  Presence of CSF oligoclonal bands  no       08/06/24 interval history/subjective:    MRIs are normal/stable.   She has chronic migraine.  She is disabled from her migraine.  She has daily migraine.  She has 30 migraine headache days a month.  She cannot function d/t her migraine. Disabled by her migraine. Has to lie in a dark room on a daily basis b/c of her migraine.      ROS: Pertinent positive and negatives per HPI.  All others were reviewed and negative.     Medications:  Current Outpatient Medications   Medication Instructions    Albuterol Sulfate  (90 Base) MCG/ACT Inhalation Aero Soln Inhalation, Every 6 hours PRN    aspirin 81 mg, Oral, Daily    atorvastatin (LIPITOR) 80 mg, Oral, Nightly    carvedilol (COREG) 6.25 mg, Oral, Nightly    multivitamin Oral Tab 1 tablet, Oral, Daily    ROPINIROLE HCL 0.5 MG Oral Tab TAKE 1 TABLET BY MOUTH NIGHTLY    sacubitril-valsartan (ENTRESTO) 24-26 MG Oral Tab 1 tablet, Oral, 2 times daily    topiramate (TOPAMAX) 50 mg, Oral, 2 times daily    venlafaxine (EFFEXOR) 25 mg, Oral, Nightly    zolpidem (AMBIEN) 5 mg, Oral, Nightly PRN        Reviewed and assessed      Objective:  Last vitals and weight :  There is no height or weight on file to calculate BMI.   There were no vitals filed for this visit.   not currently breastfeeding.  There were  no vitals taken for this visit.  Exam:  - General: appears stated age and no distress     - Pulmonary: Normal excursion of the chest.  No signs of respiratory distress.  Neurologic Exam  - Mental Status: Alert and attentive. .  Speech is spontaneous, fluent, and prosodic. Comprehension and repetition intact. Phrase length and rate are normal. No paraphasic errors, neologisms, anomia, acalculia, apraxia, anosognosia, or R/L confusion.   - Cranial Nerves: No gaze preference. Visual fields:normal  Pupils are 4mm briskly constricting to 3mm and equally round and reactive to light  in a well lit room. No rAPD. EOMI. No nystagmus. No ptosis. V1-V3 intact B/L to light touch.No pathological facial asymmetry. No flattening of the nasolabial fold. .  Hearing grossly intact.  Tongue midline. No atrophy or fasiculations of the tongue noted. Palate and uvula elevate symmetrically.  Shoulder shrug symmetric.     - Motor:  normal tone/bulk. No interosseous wasting. No flattening of hypothenar eminences.   Motor Strength    Pronator drift: No pronator drift   Arm Rolling: No orbiting.   Finger Taps: Finger taps are symmetric in rate and amplitude.    Rapid movements: symmetric. No fatiguing.   Right Left     Motor Strength   Deltoids 5 5  Triceps 5 5  Biceps 5 5   5 5   Hip Flexors 5 5   Knee extensors 5 5  Knee flexors 5 5      Foot Taps:    Asterixis: No asterixis noted.   Tremor:       Reflexes:    C5 C6 C7  L4 S1   R 2+ 2+  2+ 2+   L 2+ 2+  2+ 2+    Frontal release signs:Not assessed.    Jaw Jerk:    Danuta's sign:absent   Nonsustained clonus: Absent   Sustained clonus: Absent   - Sensory:   Light touch: normal  Temperature:normal  Pinprick:   Vibration: normal  Proprioception:      Sensory level:      Romberg:   - Cerebellum: No truncal ataxia. No titubations. No dysmetria, no dysdiadochokinesis. No rebound.   - Gait/station: Normal gait and station. Symmetric arm swing.   - Toe walking: normal  - Heel walking: normal  -  Plantar response: flexor bilaterally    Exam is unchanged on 04/04/24    Most recent lab results:   Reviewed and assessed  No results found for this or any previous visit (from the past 36 hour(s)).    Diagnostic studies:  Performed an independent visualization of  mri brain wwo from 2017, 2022  Imaging revealed:   agree w/ read            Assessment      Her lesion has been stable for 5 yrs  (2017 to 2022). She   MAY HAVE had interval attacks of new symptoms.  She has new left sided neck pain. Will repeat mri brain wwo and mri cspine wwo.  She only had  2 oligoclonal band in the csf, which  is not a positive result.   Lesions in the periaquaductal gray can be d/t wernicke's/thiamine deficiency.      Her MRIs are stable.    She has chronic migraine without aura with status migrainosus.  She has failed 2 agentss from 2 different classes: she has failed topamax and effexor.  Will try emgality.    She has had a MI and cannot be on a triptan. All tripans are contraindicated. Will order [facility] for rescue.             Plan   1. Chronic migraine without aura, intractable, with status migrainosus  - Galcanezumab-gnlm (EMGALITY) 120 MG/ML Subcutaneous Solution Auto-injector; Loading dose of 240mg SQ once.  Follow every 30 days with maintenance dose of 120mg.  See separate prescription.  Dispense: 2 each; Refill: 0  - Galcanezumab-gnlm (EMGALITY) 120 MG/ML Subcutaneous Solution Auto-injector; Inject 120 mg into the skin every 30 (thirty) days.  Dispense: 1 each; Refill: 11  - Rimegepant Sulfate (NURTEC) 75 MG Oral Tablet Dispersible; Take 75 mg by mouth as needed. Take one tablet at onset of migraine.  Maximum dose in 24 hours is 1 tablet (75mg).  Dispense: 8 tablet; Refill: 11    2. History of myocardial infarct at age less than 60 years  - Rimegepant Sulfate (NURTEC) 75 MG Oral Tablet Dispersible; Take 75 mg by mouth as needed. Take one tablet at onset of migraine.  Maximum dose in 24 hours is 1 tablet (75mg).  Dispense: 8  tablet; Refill: 11                  This document is not intended to support charting by exception.  Sections left blank in a completed note should be presumed not to have been done.      Disclaimer:   This record was dictated using  Dragon software. There may be errors due to voice recognition problems that were not realized and corrected during the completion of the note.         Thank you for allowing me to participate in the care of your patient.    Lino Mcallister DO  4/4/2024

## 2024-08-06 NOTE — PATIENT INSTRUCTIONS
To prevent headaches please take the following medication(s) monthly: emgality    When you feel the headache coming on, take: nurtec    Lifestyle modifications for migraine:  Recommend  a stable daily schedule as changes in your  daily schedule trigger headaches.    This includes maintaining the same bedtime and wake up time (including weekends), eating meals regularly, exercising, and maintaining a consistently low stress lifestyle.  Exercise: moderate intensity aerobic exercise (150 minutes/week in 3-5 sessions).  Practice good sleep hygiene. Maintain regular sleep cycles with bedtime and wake up times that do not differ significantly between weekdays and weekends.  Maintain hydration: Visit [email or web address] to determine the individual water needs based on gender, weight, weather, and level of physical activity.  Avoid factors that increase the risk of developing more frequent headaches including caffeine use, obesity, certain sleep disorders, and medication overuse.  To determine if caffeine avoidance will decrease your headaches you must abstain for at least 2 to 3 months.  If obese or overweight consider weight loss which will decrease the number of headache days.  Simple analgesics (Advil, Tylenol, Motrin, Ibuprofen, etc) are overused if taken on 15 or more days per month regardless of the reason.  All other medications are overused when taken on 10 or more days per month.  Avoid triggers.    To Do” checklist for improving your headache control    1. Initiate (and stick to) an aerobic conditioning program.    2. Keep a careful headache diary.    3. Maintain good sleep hygiene.    4. Eat nutritional, regularly spaced meals.    5. Treat acute attacks of migraine early and aggressively.    6. Don’t overuse symptomatic medication.    7. If you require prevention/suppression therapy, give the specific therapy a decent chance.    8. If you suffer from a migraine “co-morbidity,” such as chronic insomnia,  anxiety, depression, or other medical problems, seek treatment for that condition.    9. Regularly set aside time for personal relaxation and stress reduction.    10. Give back . . . to your family, friends, community. No matter how badly your migraine makes you feel, you will deal with your headache disorder more effectively if you are actively engaged in the world around you.    HEADACHE / MIGRAINE LIFESTYLE INFORMATION     Headache Preventive Treatment:   Please keep in mind that it takes 4-6 weeks for the medication to start working well and 2-3 months at the appropriate dose before deciding if it will be useful or not. If it is not helping at all by this time, then we will discuss other medications to try. Supplements may take 3-6 months until you see full effect.     Natural supplements:  Magnesium Oxide 400 mg at bedtime   Coenzyme Q10 300 mg in AM  Vitamin B2- 400mg in the morning   Feverfew 50 mg twice a day    Vitamins and herbs that show potential    Magnesium: Magnesium (400 mg at bedtime) has a relaxant effect on smooth muscles such as blood vessels. Individuals suffering from frequent or daily headache usually have low magnesium levels which can be increase with daily supplementation of 400-750 mg. Three trials found 40-90% average headache reduction  when used as a preventative. Magnesium also demonstrated the benefit in menstrually related migraine.  Magnesium is part of the messenger system in the serotonin cascade and it is a good muscle relaxant.  It is also useful for constipation which can be a side effect of other medications used to treat migraine. Good sources include nuts, whole grains, and tomatoes.    Riboflavin (vitamin B 2) 400 mg in the morning. This vitamin assists nerve cells in the production of ATP a principal energy storing molecule.  It is necessary for many chemical reactions in the body.  There have been at least 3 clinical trials of riboflavin using 400 mg per day all of which  suggested that migraine frequency can be decreased.  All 3 trials showed significant improvement in over half of migraine sufferers.  The supplement is found in bread, cereal, milk, meat, and poultry.  Most Americans get more riboflavin than the recommended daily allowance, however riboflavin deficiency is not necessary for the supplements to help prevent headache.    Feverfew: Feverfew is a common garden herb native to [place] and popular [place] [facility] [facility]  as a treatment for disorders typically controlled by aspirin.  The mechanism of action is unknown but is believed to be related to a chemical called parthenolide which helps the body use serotonin more effectively.  Serotonin helps prevent migraine and assists with resolution when it occurs.  Parthenolide also inhibits the release of histamine which is linked to pain and inflammation.    Consistency of active ingredients in different products can be a problem.  Some formulations don't have the active ingredient (parthenolide) that prevents migraine.  A parthenolide content of 0.2% is generally recommended.  Typical dosage is one capsule 3 times a day.    Coenzyme Q10: This is present in almost all cells in the body and is critical component for the conversion of energy.  Recent studies have shown that a nutritional supplement of CoQ10 can reduce the frequency of migraine attacks by improving the energy production of cells as with riboflavin.  Doses of 150 mg twice a day have been shown to be effective.    Melatonin: Increasing evidence shows correlation between melatonin secretion and headache conditions.  Melatonin supplementation has decreased headache intensity and duration.  It is widely used as a sleep aid.  Sleep is natures way of dealing with migraine.  A dose of 3 mg is recommended to start for headaches including cluster headache. Higher doses up to 15 mg has been reviewed for use in Cluster headache and have been used.  The rationale behind using  melatonin for cluster is that many theories regarding the cause of Cluster headache center around the disruption of the normal circadian rhythm in the brain.  This helps restore the normal circadian rhythm.    Tania: Tania has a small amount of antihistamine and anti-inflammatory action which may help headache.  It is primarily used for nausea and may aid in the absorption of other medications.    HEADACHE DIET: Foods and beverages which may trigger migraine  Note that only 20% of headache patients are food sensitive. You will know if you are food sensitive if you get a headache consistently 20 minutes to 2 hours after eating a certain food. Only cut out a food if it causes headaches, otherwise you might remove foods you enjoy! What matters most for diet is to eat a well balanced healthy diet full of vegetables and low fat protein, and to not miss meals.    Chocolate, other sweets    ALL cheeses except cottage and cream cheese    Dairy products, yogurt, sour cream, ice cream    Liver    Meat extracts (Bovril, Marmite, meat tenderizers)    Meats or fish which have undergone aging, fermenting, pickling or smoking. These include: Hotdogs,salami,Lox,sausage,  mortadellas,smoked salmon, pepperoni, Pickled herring    Pods of broad bean (English beans, Chinese pea pods, Italian (irish) beans, lima and navy beans    Ripe avocado, ripe banana    Yeast extracts or active yeast preparations such as Baker's or Merari's (commercial bakes goods are permitted)    Tomato based foods, pizza (lasagna, etc.)    MSG (monosodium glutamate) is disguised as many things; look for these common aliases:  Monopotassium glutamate  Autolysed yeast  Hydrolysed protein  Sodium caseinate  “flavorings”  “all natural preservatives\"    Nutrasweet    Avoid all other foods that convincingly provoke headaches.    Headache Prevention Strategies:    1. Maintain a headache diary; learn to identify and avoid triggers. Common triggers  include:    Emotional triggers:  Emotional/Upset family or friends  Emotional/Upset occupation  Business reversal/success  Anticipation anxiety  Crisis-serious  Post-crisis periodNew job/position     Physical triggers:  Vacation Day  Weekend  Strenuous Exercise  High Altitude Location  New Move  Menstrual Day  Physical Illness  Oversleep/Not enough sleep  Weather changes  Light: Photophobia or light sesnitivity treatment involves a balance between desensitization and reduction in overly strong input. Use dark polarized glasses outside, but not inside. Avoid bright or fluorescent light, but do not dim environment to the point that going into a normally lit room hurts. Consider FL-41 tint lenses, which reduce the most irritating wavelengths without blocking too much light.  These can be obtained at [employer] or [employer]  Foods: see list above.    2. Limit use of acute treatments (over-the-counter medications, triptans, etc.) to no more than 2 days per week or 10 days per month to prevent medication overuse headache (rebound headache).      3. Follow a regular schedule (including weekends and holidays):  Don't skip meals. Eat a balanced diet.  8 hours of sleep nightly.  Minimize stress.  Exercise 30 minutes per day. Being overweight is associated with a 5 times increased risk of chronic migraine.  Keep well hydrated and drink at least 6-8 glasses of water per day.    4. Initiate non-pharmacologic measures at the earliest onset of your headache.  Rest and quiet environment.  Relax and reduce stress. Fzplcws3Nacqf is a free michael that can instruct you on    some simple relaxtion and breathing techniques. Http://[employer].CloudJay is a    free website that provides teaching videos on relaxation.  Also, there are  many apps that   can be downloaded for “mindful” relaxation.  An michael called YOGA NIDRA will help walk you through mindfulness.  Cold compresses.    5. Don't wait!! Take the maximum allowable dosage of  prescribed medication at the first sign of migraine.    6. Compliance:  Take prescribed medication regularly as directed and at the first sign of a migraine.    7. Communicate:  Call your physician when problems arise, especially if your headaches change, increase in frequency/severity, or become associated with neurological symptoms (weakness, numbness, slurred speech, etc.).    8. Headache/pain management therapies: Consider various complementary methods, including medication, behavioral therapy, psychological counselling, biofeedback, massage therapy, acupuncture, dry needling, and other modalities.  Such measures may reduce the need for medications. Counseling for pain management, where patients learn to function and ignore/minimize their pain, seems to work very well.    9. Recommend changing family's attention and focus away from patient's headaches. Instead, emphasize daily activities. If first question of day is 'How are your headaches/Do you have a headache today?', then patient will constantly think about headaches, thus making them worse. Goal is to re-direct attention away from headaches, toward daily activities and other distractions.    10. Helpful Websites:  www.AmericanHeadacheSociety.org  www.migrainetrust.org  www.headaches.org  www.migraine.org.uk  www.achenet.org    11. HEADACHE EXPECTATIONS:  There are many types of headaches, and only a rare few in which complete relief can be expected.  In general, there is no cure for headache, especially migraine based headaches.  There is nothing available that completely prevents headaches from occurring, breaking through, or having periodic flare-ups and fluctuations.  Regardless of what you are using on a daily basis for prevention, episodic headaches should still be expected, and periods where frequency may escalate and fluctuate are unavoidable.     There is no quick fix for most headaches.  Furthermore, the longer you have had high frequency headaches  (such as chronic daily headache), the longer it will likely take to expect any improvement.  In fact, some people will never improve, regardless of how many medications or other treatments we try.  Our treatment strategy is to evaluate for possible causes of your headache, although testing is usually always normal, even in cases of daily continuous headaches for years.  Most types of headache such as migraine are electrical brain disorders (similar to how epilepsy is an electrical brain disorders).  Therefore, there is no testing that will reveal this \"dysfunctional electrical circuitry\" such on MRI, or other testing.  We try to find a medication that may help lessen the frequency and/or severity of your headaches.  The goal is not to completely stop them from happening, although if that happens, great!  Different people respond to different medications, and some people just don't respond to anything, so it's usually a matter of trying different options.  We can not predict if or when exactly you will respond to a treatment that we provide.     Preventive headache medications take 4-6 weeks to start working, and 2-3 months to see full effect, assuming you reach an effective dose.  Therefore, calling or messaging frequently because you have a headache flare prior to the 3 month laurie is unlikely to change anything, and unfortunately there is nothing available that will expedite this, so please try to avoid this.  Our recommendation will generally be to give it adequate time first.  If you are unable to wait it out for medications to work, we can also try IV infusions for some temporary relief.  Or, we offer our 3 week outpatient chronic daily headache program (IMATCH) to help you better learn how to function and deal with chronic headache issues.     In general, the best that preventive medications or other treatments (including Botox) are able to offer in migraine management (variable in other headache types) is a  50% improvement in frequency and/or severity of headache.  That is our goal, and any additional benefit is considered a bonus.  Some people do significantly better than this, others do not get close to this.  Therefore, if your headaches are not improving by at least 3 months on your preventive strategy, contact us and we can discuss further adjustments.  Keep in mind that complete headache cure is not a realistic expectation.    Our Team:  The nursing staff, and medical assistants are a major part of YOUR TREATMENT TEAM and will be handling your phone calls and inquiries, if any. Unless explicitly told otherwise at the time of your office visit, your study results and ensuing treatment plans will be released via [employer] and discussed during your follow-up appointment.     MyChart: Please ask the schedulers to give you an activation code. The main way of communication is by [employer] rather than phone lines, so if you have not signed up, please do so. Dafitit is also the way that you can review your labs and testing.  We are not able to contact everyone to tell them results are normal.  If you do not hear back from us regarding testing you have had, it should be considered normal or within normal range.  If you have any questions about the results, you are free to message us.     [employer] is meant for simple questions regarding medications, possible side effects, or other simple straight forward questions in limited sentences, rather than multiple paragraphs of discussion.  [employer] is not meant for, or efficient for these complex questions, extensive questions, extensive medication adjustments, complex new symptoms or concerns.  These issues beyond simple questions require a follow up visit with myself,      Refills:  Please pay attention to when your refills will need to be renewed. Due to the volume of phone calls daily, this could potentially take a few days, although we certainly try to honor your refill requests  as soon as we can.  You should call at least 1 week in advance of needing a refill to ensure you do not run out of medication.  Keep in mind that refill requests on Fridays may not be filled until the following week.

## 2024-08-08 ENCOUNTER — TELEPHONE (OUTPATIENT)
Dept: NEUROLOGY | Facility: CLINIC | Age: 49
End: 2024-08-08

## 2024-08-08 ENCOUNTER — MED REC SCAN ONLY (OUTPATIENT)
Dept: NEUROLOGY | Facility: CLINIC | Age: 49
End: 2024-08-08

## 2024-08-08 NOTE — TELEPHONE ENCOUNTER
PA completed 8/8/24    [id number]    PA approval letter received from [employer]  Emgality 120mg/ml auto inj  up to 2 autoinj for the 1st month then up to 1 autoinj per 28 days thereafter APPROVED from 7/9/24 to 8/8/25      Letter sent to [employer]

## 2024-09-15 ENCOUNTER — TELEPHONE (OUTPATIENT)
Dept: INTERNAL MEDICINE CLINIC | Facility: CLINIC | Age: 49
End: 2024-09-15

## 2024-09-19 NOTE — TELEPHONE ENCOUNTER
Who has patient been getting venlafaxine 75mg from?      Venlafaxine 25mg Last written by Dr. Kaplan 2/14/2023 for #90 +1 refill.      On 5/31/2024 office visit Dr. Kaplan notes increase to venlafaxine 75mg but no prescription is sent. Venlafaxine 75mg is marked as patient reported.

## 2024-09-20 RX ORDER — VENLAFAXINE 25 MG/1
25 TABLET ORAL NIGHTLY
Qty: 90 TABLET | Refills: 1 | OUTPATIENT
Start: 2024-09-20

## 2024-09-20 RX ORDER — VENLAFAXINE 75 MG/1
75 TABLET ORAL DAILY
Refills: 0 | OUTPATIENT
Start: 2024-09-20

## 2024-09-24 NOTE — TELEPHONE ENCOUNTER
Attempted to reach patient, left VM to call us back.     No response letter sent - several attempts made to reach patient as per below without response

## 2024-10-25 RX ORDER — VENLAFAXINE 75 MG/1
75 TABLET ORAL DAILY
Qty: 30 TABLET | Refills: 5 | Status: SHIPPED | OUTPATIENT
Start: 2024-10-25

## 2024-10-25 NOTE — TELEPHONE ENCOUNTER
Pt requesting a refill of venlafaxine 75mg tablets.    Last office visit: 8/6/24    Next office visit: None scheduled.     Plan  1. Chronic migraine without aura, intractable, with status migrainosus  - Galcanezumab-gnlm (EMGALITY) 120 MG/ML Subcutaneous Solution Auto-injector; Loading dose of 240mg SQ once.  Follow every 30 days with maintenance dose of 120mg.  See separate prescription.  Dispense: 2 each; Refill: 0  - Galcanezumab-gnlm (EMGALITY) 120 MG/ML Subcutaneous Solution Auto-injector; Inject 120 mg into the skin every 30 (thirty) days.  Dispense: 1 each; Refill: 11  - Rimegepant Sulfate (NURTEC) 75 MG Oral Tablet Dispersible; Take 75 mg by mouth as needed. Take one tablet at onset of migraine.  Maximum dose in 24 hours is 1 tablet (75mg).  Dispense: 8 tablet; Refill: 11     2. History of myocardial infarct at age less than 60 years  - Rimegepant Sulfate (NURTEC) 75 MG Oral Tablet Dispersible; Take 75 mg by mouth as needed. Take one tablet at onset of migraine.  Maximum dose in 24 hours is 1 tablet (75mg).  Dispense: 8 tablet; Refill: 11                          This document is not intended to support charting by exception.  Sections left blank in a completed note should be presumed not to have been done.        Disclaimer:   This record was dictated using  Dragon software. There may be errors due to voice recognition problems that were not realized and corrected during the completion of the note.           Thank you for allowing me to participate in the care of your patient.     Lino Mcallister DO  4/4/2024

## 2025-04-21 RX ORDER — VENLAFAXINE 75 MG/1
75 TABLET ORAL DAILY
Qty: 90 TABLET | Refills: 0 | Status: SHIPPED | OUTPATIENT
Start: 2025-04-21

## 2025-04-21 NOTE — TELEPHONE ENCOUNTER
Requested Prescriptions     Pending Prescriptions Disp Refills    VENLAFAXINE 75 MG Oral Tab [Pharmacy Med Name: VENLAFAXINE HCL 75 MG TABLET] 90 tablet 0     Sig: TAKE 1 TABLET BY MOUTH EVERY DAY      In person appointment or virtual visit in the past 6 mos or appointment in next 3 mos    Depression Screening completed within the past 12 months    Medication is active on med list       Last OV:   Next OV:     [place]/;  Venlafaxine HCl     Dispensed Written Strength Quantity Refills Days Supply Provider Pharmacy   VENLAFAXINE HCL 75 MG TABLET 01/24/2025 10/25/2024  90 each  90 Fatoumata Baird [facility]/pharmacy #[id number] - ...   VENLAFAXINE HCL 75 MG TABLET 10/29/2024 10/25/2024  90 each  90 Fatoumata Baird [facility]/pharmacy #[id number] - ..       Last office visit plan;   Assessment  Her lesion has been stable for 5 yrs  (2017 to 2022). She   MAY HAVE had interval attacks of new symptoms.  She has new left sided neck pain. Will repeat mri brain wwo and mri cspine wwo.  She only had  2 oligoclonal band in the csf, which  is not a positive result.   Lesions in the periaquaductal gray can be d/t wernicke's/thiamine deficiency.       Her MRIs are stable.     She has chronic migraine without aura with status migrainosus.  She has failed 2 agentss from 2 different classes: she has failed topamax and effexor.  Will try emgality.     She has had a MI and cannot be on a triptan. All tripans are contraindicated. Will order nurte for rescue.        Plan  1. Chronic migraine without aura, intractable, with status migrainosus  - Galcanezumab-gnlm (EMGALITY) 120 MG/ML Subcutaneous Solution Auto-injector; Loading dose of 240mg SQ once.  Follow every 30 days with maintenance dose of 120mg.  See separate prescription.  Dispense: 2 each; Refill: 0  - Galcanezumab-gnlm (EMGALITY) 120 MG/ML Subcutaneous Solution Auto-injector; Inject 120 mg into the skin every 30 (thirty) days.  Dispense: 1 each; Refill: 11  - Rimegepant Sulfate (NURTEC) 75 MG Oral  Tablet Dispersible; Take 75 mg by mouth as needed. Take one tablet at onset of migraine.  Maximum dose in 24 hours is 1 tablet (75mg).  Dispense: 8 tablet; Refill: 11     2. History of myocardial infarct at age less than 60 years  - Rimegepant Sulfate (NURTEC) 75 MG Oral Tablet Dispersible; Take 75 mg by mouth as needed. Take one tablet at onset of migraine.  Maximum dose in 24 hours is 1 tablet (75mg).  Dispense: 8 tablet; Refill: 11       This document is not intended to support charting by exception.  Sections left blank in a completed note should be presumed not to have been done.        Disclaimer:   This record was dictated using  Dragon software. There may be errors due to voice recognition problems that were not realized and corrected during the completion of the note.           Thank you for allowing me to participate in the care of your patient.     Lino Mcallister DO  4/4/2024

## 2025-05-02 NOTE — TELEPHONE ENCOUNTER
Requested Prescriptions     Pending Prescriptions Disp Refills    topiramate 25 MG Oral Tab 360 tablet 0     Sig: Take 2 tablets (50 mg total) by mouth 2 (two) times daily.       Last OV: 8/6/2024  Next OV:     [place]/; unable to verify      Last office visit plan;    Assessment  Her lesion has been stable for 5 yrs  (2017 to 2022). She   MAY HAVE had interval attacks of new symptoms.  She has new left sided neck pain. Will repeat mri brain wwo and mri cspine wwo.  She only had  2 oligoclonal band in the csf, which  is not a positive result.   Lesions in the periaquaductal gray can be d/t wernicke's/thiamine deficiency.       Her MRIs are stable.     She has chronic migraine without aura with status migrainosus.  She has failed 2 agentss from 2 different classes: she has failed topamax and effexor.  Will try emgality.     She has had a MI and cannot be on a triptan. All tripans are contraindicated. Will order [facility] for rescue.               Plan  1. Chronic migraine without aura, intractable, with status migrainosus  - Galcanezumab-gnlm (EMGALITY) 120 MG/ML Subcutaneous Solution Auto-injector; Loading dose of 240mg SQ once.  Follow every 30 days with maintenance dose of 120mg.  See separate prescription.  Dispense: 2 each; Refill: 0  - Galcanezumab-gnlm (EMGALITY) 120 MG/ML Subcutaneous Solution Auto-injector; Inject 120 mg into the skin every 30 (thirty) days.  Dispense: 1 each; Refill: 11  - Rimegepant Sulfate (NURTEC) 75 MG Oral Tablet Dispersible; Take 75 mg by mouth as needed. Take one tablet at onset of migraine.  Maximum dose in 24 hours is 1 tablet (75mg).  Dispense: 8 tablet; Refill: 11     2. History of myocardial infarct at age less than 60 years  - Rimegepant Sulfate (NURTEC) 75 MG Oral Tablet Dispersible; Take 75 mg by mouth as needed. Take one tablet at onset of migraine.  Maximum dose in 24 hours is 1 tablet (75mg).  Dispense: 8 tablet; Refill: 11       This document is not intended to support  charting by exception.  Sections left blank in a completed note should be presumed not to have been done.        Disclaimer:   This record was dictated using  Dragon software. There may be errors due to voice recognition problems that were not realized and corrected during the completion of the note.           Thank you for allowing me to participate in the care of your patient.     Lino Mcallister,   4/4/2024        Other Notes    All notes  Instructions      Return in about 3 months (around 11/6/2024).

## 2025-05-05 RX ORDER — TOPIRAMATE 25 MG/1
50 TABLET, FILM COATED ORAL 2 TIMES DAILY
Qty: 360 TABLET | Refills: 0 | Status: SHIPPED | OUTPATIENT
Start: 2025-05-05

## 2025-06-03 ENCOUNTER — E-VISIT (OUTPATIENT)
Dept: TELEHEALTH | Age: 50
End: 2025-06-03
Payer: COMMERCIAL

## 2025-06-03 DIAGNOSIS — M54.9 SEVERE BACK PAIN: Primary | ICD-10-CM

## 2025-06-03 PROCEDURE — 99421 OL DIG E/M SVC 5-10 MIN: CPT | Performed by: PHYSICIAN ASSISTANT

## 2025-06-03 NOTE — PROGRESS NOTES
Gela Dinh is a 50 year old female who initiated e-visit care today.    HPI:   See answers to questionnaire submission     Current Medications[1]   Past Medical History[2]   Past Surgical History[3]   Family History[4]   Social History:  Short Social Hx on File[5]      ASSESSMENT AND PLAN:       Diagnoses and all orders for this visit:    Severe back pain       Reports >4 weeks of severe back pain.  Needs in-person exam.  Advised PCP or IC    Duration of  the service:  2 minutes      See [employer] message exchange and Patient Instructions for Comfort Care and patient education.          [1]   Current Outpatient Medications   Medication Sig Dispense Refill    topiramate 25 MG Oral Tab Take 2 tablets (50 mg total) by mouth 2 (two) times daily. 360 tablet 0    VENLAFAXINE 75 MG Oral Tab TAKE 1 TABLET BY MOUTH EVERY DAY 90 tablet 0    venlafaxine 75 MG Oral Tab Take 1 tablet (75 mg total) by mouth daily. 30 tablet 5    Galcanezumab-gnlm (EMGALITY) 120 MG/ML Subcutaneous Solution Auto-injector Inject 120 mg into the skin every 30 (thirty) days. 1 each 11    Rimegepant Sulfate (NURTEC) 75 MG Oral Tablet Dispersible Take 75 mg by mouth as needed. Take one tablet at onset of migraine.  Maximum dose in 24 hours is 1 tablet (75mg). 8 tablet 11    albuterol 108 (90 Base) MCG/ACT Inhalation Aero Soln Inhale 2 puffs into the lungs every 6 (six) hours as needed for Wheezing. 1 each 2    sacubitril-valsartan 49-51 MG Oral Tab Take 1 tablet by mouth 2 (two) times daily.      venlafaxine 75 MG Oral Tab Take 1 tablet (75 mg total) by mouth daily.      Cyanocobalamin 1000 MCG Sublingual SL Tab Place 1,000 mcg under the tongue once a week.      atorvastatin 80 MG Oral Tab Take 1 tablet (80 mg total) by mouth nightly. 30 tablet 0    carvedilol 6.25 MG Oral Tab Take 1 tablet (6.25 mg total) by mouth at bedtime. 30 tablet 0    zolpidem 5 MG Oral Tab Take 1 tablet (5 mg total) by mouth nightly as needed for Sleep. 30 tablet 2     ROPINIROLE HCL 0.5 MG Oral Tab TAKE 1 TABLET BY MOUTH NIGHTLY 90 tablet 0    aspirin 81 MG Oral Chew Tab Chew 1 tablet (81 mg total) by mouth daily. 30 tablet 2    multivitamin Oral Tab Take 1 tablet by mouth daily.     [2]   Past Medical History:   AML (acute myelogenous leukemia) (HCC)    Treated with remission    LEXY positive    Anxiety and depression    Asthma (HCC)    Essential hypertension    Hypercholesterolemia    Insomnia due to medical condition    Migraine without aura    Numbness in feet    Obstructive sleep apnea    On nightly CPAP    Periodic limb movements of sleep    Seasonal allergic rhinitis due to pollen    Subclinical hyperthyroidism    Systolic CHF (HCC)    Echo 10-20 with EF 30-35%; CTA coronary arteries 10-20 normal; Echo 8-21 with EF 47%; Echo 6-22 with EF 55%; Echo 4-24 with EF 45%, mild AI, moderate TR   [3]   Past Surgical History:  Procedure Laterality Date    Total abdom hysterectomy  2016    With BSO    Umbilical hernia repair  2005    x2   [4]   Family History  Problem Relation Age of Onset    Hypertension Mother     Hypertension Father     Other (CHF) Father     Pancreatic Cancer Maternal Grandfather     Other (Bladder Cancer) Maternal Grandfather    [5]   Social History  Socioeconomic History    Marital status:    Tobacco Use    Smoking status: Former     Current packs/day: 0.00     Types: Cigarettes     Start date:      Quit date: 2000     Years since quittin.4    Smokeless tobacco: Never    Tobacco comments:     Occasional   Substance and Sexual Activity    Alcohol use: Yes     Comment: Occasional    Drug use: No   Other Topics Concern    Caffeine Concern Yes     Comment: coffee    Exercise Yes     Comment: daily walks     Social Drivers of Health     Food Insecurity: No Food Insecurity (2020)    Received from [facility]    Hunger Vital Sign     Worried About Running Out of Food in the Last Year: Never true     Ran Out of Food in the  Last Year: Never true   Transportation Needs: Unknown (4/22/2020)    Received from [facility]    PRAPARE - Transportation     Lack of Transportation (Medical): Patient declined     Lack of Transportation (Non-Medical): No   Stress: Stress Concern Present (4/22/2020)    Received from [facility]    [employer] - Occupational Stress Questionnaire     Feeling of Stress : To some extent    Received from [facility]    Housing Stability

## 2025-07-25 RX ORDER — VENLAFAXINE 75 MG/1
75 TABLET ORAL DAILY
Qty: 30 TABLET | Refills: 0 | Status: SHIPPED | OUTPATIENT
Start: 2025-07-25

## 2025-07-25 NOTE — TELEPHONE ENCOUNTER
The patient is requesting a refill on: VENLAFAXINE HCL 75 MG TABLET     [place]):Venlafaxine HCl     Dispensed Written Strength Quantity Refills Days Supply Provider Pharmacy   VENLAFAXINE HCL 75 MG TABLET 04/29/2025 04/21/2025  90 each  90 Lino Mcallister DO [facility]/pharmacy #[id number] - [place]...     Last OV: 8/6/24  Next OV:   No future appointments.   Assessment  Her lesion has been stable for 5 yrs  (2017 to 2022). She   MAY HAVE had interval attacks of new symptoms.  She has new left sided neck pain. Will repeat mri brain wwo and mri cspine wwo.  She only had  2 oligoclonal band in the csf, which  is not a positive result.   Lesions in the periaquaductal gray can be d/t wernicke's/thiamine deficiency.       Her MRIs are stable.     She has chronic migraine without aura with status migrainosus.  She has failed 2 agentss from 2 different classes: she has failed topamax and effexor.  Will try emgality.     She has had a MI and cannot be on a triptan. All tripans are contraindicated. [facility]      Plan  1. Chronic migraine without aura, intractable, with status migrainosus  - Galcanezumab-gnlm (EMGALITY) 120 MG/ML Subcutaneous Solution Auto-injector; Loading dose of 240mg SQ once.  Follow every 30 days with maintenance dose of 120mg.  See separate prescription.  Dispense: 2 each; Refill: 0  - Galcanezumab-gnlm (EMGALITY) 120 MG/ML Subcutaneous Solution Auto-injector; Inject 120 mg into the skin every 30 (thirty) days.  Dispense: 1 each; Refill: 11  - Rimegepant Sulfate (NURTEC) 75 MG Oral Tablet Dispersible; Take 75 mg by mouth as needed. Take one tablet at onset of migraine.  Maximum dose in 24 hours is 1 tablet (75mg).  Dispense: 8 tablet; Refill: 11     2. History of myocardial infarct at age less than 60 years  - Rimegepant Sulfate (NURTEC) 75 MG Oral Tablet Dispersible; Take 75 mg by mouth as needed. Take one tablet at onset of migraine.  Maximum dose in 24 hours is 1  tablet (75mg).  Dispense: 8 tablet; Refill: 11    Instructions     Return in about 3 months (around 11/6/2024).

## 2025-08-05 RX ORDER — TOPIRAMATE 25 MG/1
50 TABLET, FILM COATED ORAL 2 TIMES DAILY
Qty: 360 TABLET | Refills: 0 | Status: SHIPPED | OUTPATIENT
Start: 2025-08-05 | End: 2025-08-05

## 2025-08-06 RX ORDER — VENLAFAXINE 75 MG/1
75 TABLET ORAL DAILY
Qty: 30 TABLET | Refills: 0 | Status: SHIPPED | OUTPATIENT
Start: 2025-08-06

## 2025-08-06 RX ORDER — TOPIRAMATE 25 MG/1
50 TABLET, FILM COATED ORAL 2 TIMES DAILY
Qty: 360 TABLET | Refills: 0 | Status: SHIPPED | OUTPATIENT
Start: 2025-08-06

## (undated) NOTE — Clinical Note
I saw Nadyne Closs today. She has mild fluid overload with wt gain and abd bloating, I gave her lasix 20 mg daily x2 days and increased her entresto to 49-51 mg bid. BP normal, HR 60 's on increased coreg, overall she feels ok.  Renal function normal. I'll see h

## (undated) NOTE — Clinical Note
I saw Esperanza Pinon today. She has persistent fatigue and frequent orthostatic dizziness in the evenings with palpitations. Seen in ED 11/19, SR, bmp, cbc tft's nml.  I switched her to coreg cr 10 mg 2+ months ago but she was taking it bid, She switched back to c

## (undated) NOTE — LETTER
9/24/2024          Gela Dinh        [address] [facility] [id number]         Dear Gela,    This letter is to inform you that our office has made several attempts to reach you by phone without success.  We were attempting to contact you by phone regarding prescription request.    Please contact our office at the number listed below as soon as you receive this letter to discuss this issue and to make the necessary changes in our system to your contact information.  Thank you for your cooperation.        Sincerely,    Charlie Kaplan MD  [address] [place] [id number]  Ph: [phone]  Fax: [phone]         Document electronically generated by:  Rocio HERNANDEZ RN

## (undated) NOTE — LETTER
Date: 10/13/2020    Patient Name: Luke Chou          To Whom it may concern: This letter has been written at the patient's request. The above patient was seen at the [facility] for  treatment of a medical condition.     This

## (undated) NOTE — Clinical Note
I saw Cristiana Reyes today in the HF clinic. She was admitted to [facility] with syncopal episodes x2 with hypotension after increasing entresto dose. No arrhythmias noted, EF improved to 50% on echo.  Home on lower dose coreg at 12.5 mg bid and lower dose  entres

## (undated) NOTE — ED AVS SNAPSHOT
[facility] Emergency Department  [place] [address]   [place] [address]  Phone:  [phone]  Fax:  [address]   MRN: [id number]    Department:  [facility] Emergency Department   Date of Visit:  7/19/2017 and Class Registration line at [phone] or find a doctor online by visiting [email or web address].    IF THERE IS ANY CHANGE OR WORSENING OF YOUR CONDITION, CALL YOUR PRIMARY CARE PHYSICIAN AT ONCE OR RETURN IMMEDIATELY TO [address] [place] [address].     If

## (undated) NOTE — LETTER
[address] [facility]    Consent for Coronary CT Angiography    Your doctor has recommended that you have a Coronary CT Angiography procedure.  Coronary CT Angiography is a diagnostic procedure using computed tomography to scan the The medication and the contrast material used as part of your procedure are all deemed very safe, however there is always an element of risk to a patient when taking medication.  Allergic reactions to medication can range from very minor to very serious bebo

## (undated) NOTE — Clinical Note
I saw Gaston Pito in the HF clinic. She's c/o persistent fatigue with dizziness and now diarrhea for last 2 weeks. She is euvolemic, normal renal function, in sinus kirit, HF 52 bpm. BP nml. She was taking coreg cr 10 mg bid instead of daily, dose adjusted.  Sh

## (undated) NOTE — Clinical Note
I saw Gaston Ards today. She is doing well. BP is normal, renal function stable on baljinder and enalapril and tolerating coreg.  I switched her from enalapril to entresto ( starting 10/29/) She had long episode of heart racing at night , waking her from 2 am to 10